# Patient Record
Sex: MALE | Race: WHITE | NOT HISPANIC OR LATINO | Employment: OTHER | URBAN - NONMETROPOLITAN AREA
[De-identification: names, ages, dates, MRNs, and addresses within clinical notes are randomized per-mention and may not be internally consistent; named-entity substitution may affect disease eponyms.]

---

## 2023-08-08 PROCEDURE — 99283 EMERGENCY DEPT VISIT LOW MDM: CPT

## 2023-08-09 ENCOUNTER — HOSPITAL ENCOUNTER (EMERGENCY)
Facility: HOSPITAL | Age: 68
Discharge: HOME | End: 2023-08-09
Payer: COMMERCIAL

## 2023-08-09 VITALS
TEMPERATURE: 98.9 F | RESPIRATION RATE: 18 BRPM | HEART RATE: 72 BPM | BODY MASS INDEX: 23.54 KG/M2 | SYSTOLIC BLOOD PRESSURE: 142 MMHG | HEIGHT: 67 IN | OXYGEN SATURATION: 98 % | WEIGHT: 150 LBS | DIASTOLIC BLOOD PRESSURE: 91 MMHG

## 2023-08-09 DIAGNOSIS — L30.9 DERMATITIS: Primary | ICD-10-CM

## 2023-08-09 LAB
ALBUMIN SERPL-MCNC: 4.4 G/DL (ref 3.5–5.2)
ALBUMIN/GLOB SERPL: 1.8 G/DL
ALP SERPL-CCNC: 121 U/L (ref 39–117)
ALT SERPL W P-5'-P-CCNC: 18 U/L (ref 1–41)
ANION GAP SERPL CALCULATED.3IONS-SCNC: 13 MMOL/L (ref 5–15)
AST SERPL-CCNC: 26 U/L (ref 1–40)
BASOPHILS # BLD AUTO: 0.02 10*3/MM3 (ref 0–0.2)
BASOPHILS NFR BLD AUTO: 0.3 % (ref 0–1.5)
BILIRUB SERPL-MCNC: 0.2 MG/DL (ref 0–1.2)
BUN SERPL-MCNC: 35 MG/DL (ref 8–23)
BUN/CREAT SERPL: 39.8 (ref 7–25)
CALCIUM SPEC-SCNC: 9.1 MG/DL (ref 8.6–10.5)
CHLORIDE SERPL-SCNC: 107 MMOL/L (ref 98–107)
CO2 SERPL-SCNC: 22 MMOL/L (ref 22–29)
CREAT SERPL-MCNC: 0.88 MG/DL (ref 0.76–1.27)
CRP SERPL-MCNC: 0.98 MG/DL (ref 0–0.5)
DEPRECATED RDW RBC AUTO: 43.6 FL (ref 37–54)
EGFRCR SERPLBLD CKD-EPI 2021: 93.7 ML/MIN/1.73
EOSINOPHIL # BLD AUTO: 0.34 10*3/MM3 (ref 0–0.4)
EOSINOPHIL NFR BLD AUTO: 5.4 % (ref 0.3–6.2)
ERYTHROCYTE [DISTWIDTH] IN BLOOD BY AUTOMATED COUNT: 13.4 % (ref 12.3–15.4)
ERYTHROCYTE [SEDIMENTATION RATE] IN BLOOD: 7 MM/HR (ref 0–20)
GLOBULIN UR ELPH-MCNC: 2.5 GM/DL
GLUCOSE SERPL-MCNC: 105 MG/DL (ref 65–99)
HCT VFR BLD AUTO: 40.3 % (ref 37.5–51)
HGB BLD-MCNC: 13.1 G/DL (ref 13–17.7)
IMM GRANULOCYTES # BLD AUTO: 0.02 10*3/MM3 (ref 0–0.05)
IMM GRANULOCYTES NFR BLD AUTO: 0.3 % (ref 0–0.5)
LYMPHOCYTES # BLD AUTO: 1.02 10*3/MM3 (ref 0.7–3.1)
LYMPHOCYTES NFR BLD AUTO: 16.3 % (ref 19.6–45.3)
MCH RBC QN AUTO: 28.7 PG (ref 26.6–33)
MCHC RBC AUTO-ENTMCNC: 32.5 G/DL (ref 31.5–35.7)
MCV RBC AUTO: 88.2 FL (ref 79–97)
MONOCYTES # BLD AUTO: 0.48 10*3/MM3 (ref 0.1–0.9)
MONOCYTES NFR BLD AUTO: 7.7 % (ref 5–12)
NEUTROPHILS NFR BLD AUTO: 4.36 10*3/MM3 (ref 1.7–7)
NEUTROPHILS NFR BLD AUTO: 70 % (ref 42.7–76)
NRBC BLD AUTO-RTO: 0 /100 WBC (ref 0–0.2)
PLATELET # BLD AUTO: 215 10*3/MM3 (ref 140–450)
PMV BLD AUTO: 9.3 FL (ref 6–12)
POTASSIUM SERPL-SCNC: 4 MMOL/L (ref 3.5–5.2)
PROT SERPL-MCNC: 6.9 G/DL (ref 6–8.5)
RBC # BLD AUTO: 4.57 10*6/MM3 (ref 4.14–5.8)
SODIUM SERPL-SCNC: 142 MMOL/L (ref 136–145)
WBC NRBC COR # BLD: 6.24 10*3/MM3 (ref 3.4–10.8)

## 2023-08-09 PROCEDURE — 96375 TX/PRO/DX INJ NEW DRUG ADDON: CPT

## 2023-08-09 PROCEDURE — 85025 COMPLETE CBC W/AUTO DIFF WBC: CPT

## 2023-08-09 PROCEDURE — 86140 C-REACTIVE PROTEIN: CPT

## 2023-08-09 PROCEDURE — 96374 THER/PROPH/DIAG INJ IV PUSH: CPT

## 2023-08-09 PROCEDURE — 80053 COMPREHEN METABOLIC PANEL: CPT

## 2023-08-09 PROCEDURE — 25010000002 DIPHENHYDRAMINE PER 50 MG

## 2023-08-09 PROCEDURE — 25010000002 DEXAMETHASONE PER 1 MG

## 2023-08-09 PROCEDURE — 25010000002 DEXAMETHASONE PER 1 MG: Performed by: EMERGENCY MEDICINE

## 2023-08-09 PROCEDURE — 85652 RBC SED RATE AUTOMATED: CPT

## 2023-08-09 RX ORDER — DEXAMETHASONE SODIUM PHOSPHATE 10 MG/ML
10 INJECTION INTRAMUSCULAR; INTRAVENOUS ONCE
Status: DISCONTINUED | OUTPATIENT
Start: 2023-08-09 | End: 2023-08-09

## 2023-08-09 RX ORDER — PREDNISONE 20 MG/1
60 TABLET ORAL DAILY
Qty: 21 TABLET | Refills: 0 | Status: SHIPPED | OUTPATIENT
Start: 2023-08-09 | End: 2023-08-16

## 2023-08-09 RX ORDER — DEXAMETHASONE SODIUM PHOSPHATE 10 MG/ML
10 INJECTION INTRAMUSCULAR; INTRAVENOUS ONCE
Status: COMPLETED | OUTPATIENT
Start: 2023-08-09 | End: 2023-08-09

## 2023-08-09 RX ORDER — SODIUM CHLORIDE 0.9 % (FLUSH) 0.9 %
10 SYRINGE (ML) INJECTION AS NEEDED
Status: DISCONTINUED | OUTPATIENT
Start: 2023-08-09 | End: 2023-08-09 | Stop reason: HOSPADM

## 2023-08-09 RX ORDER — DIPHENHYDRAMINE HYDROCHLORIDE 50 MG/ML
25 INJECTION INTRAMUSCULAR; INTRAVENOUS ONCE
Status: COMPLETED | OUTPATIENT
Start: 2023-08-09 | End: 2023-08-09

## 2023-08-09 RX ORDER — FAMOTIDINE 10 MG/ML
20 INJECTION, SOLUTION INTRAVENOUS ONCE
Status: COMPLETED | OUTPATIENT
Start: 2023-08-09 | End: 2023-08-09

## 2023-08-09 RX ORDER — DIPHENHYDRAMINE HCL 25 MG
25 TABLET ORAL EVERY 6 HOURS PRN
Qty: 40 TABLET | Refills: 0 | Status: SHIPPED | OUTPATIENT
Start: 2023-08-09

## 2023-08-09 RX ADMIN — FAMOTIDINE 20 MG: 10 INJECTION INTRAVENOUS at 01:05

## 2023-08-09 RX ADMIN — DIPHENHYDRAMINE HYDROCHLORIDE 25 MG: 50 INJECTION, SOLUTION INTRAMUSCULAR; INTRAVENOUS at 01:05

## 2023-08-09 RX ADMIN — DEXAMETHASONE SODIUM PHOSPHATE 10 MG: 10 INJECTION INTRAMUSCULAR; INTRAVENOUS at 01:18

## 2023-08-09 NOTE — DISCHARGE INSTRUCTIONS
Today you were seen in the ED for your symptoms. I am sending in steroids and benadryl which you should use at home. Please follow up with your PCP in the next 24 hours and return to the ED with any new, worsening, or persistent symptoms especially difficulty breathing or shortness of breath.

## 2023-08-09 NOTE — ED PROVIDER NOTES
Subjective   History of Present Illness  Patient is a 67-year-old male who presents to the ED today complaining of facial swelling and rash to bilateral upper extremities that he reports began last night.  He states he noticed his symptoms began when he used a new blanket that was very furry and itchy in nature.  Patient is generally a poor historian, does have some sort of obvious developmental delay, although there is no PMH reported or on file.  He denies taking any chronic medications, specifically no hypertension medications.  On exam there are no obvious signs of angioedema, uvula is midline, voice is clear and normal, no trismus, no swelling noted to the neck.  He does have obvious swelling noted to the face, worse around the periorbital region bilaterally to the point that he is hardly able to open his eyes.  There is an obvious raised, erythemic, vesicular itching rash noted to bilateral upper extremities.  Patient is actively scratching on my exam. Per the nursing note the patient admitted that he also has been working outside over the last few days, gardening. Unsure of any poison ivy exposure. Differential diagnoses: Allergic reaction, contact dermatitis, poison ivy dermatitis, cellulitis, and other.    History provided by:  Patient   used: No      Review of Systems   Constitutional: Negative.    HENT:  Positive for facial swelling. Negative for drooling, sore throat, trouble swallowing and voice change.    Eyes: Negative.    Respiratory:  Negative for cough, shortness of breath, wheezing and stridor.    Cardiovascular: Negative.    Gastrointestinal: Negative.    Genitourinary: Negative.    Musculoskeletal: Negative.    Skin:  Positive for rash.   Neurological: Negative.    Psychiatric/Behavioral: Negative.       History reviewed. No pertinent past medical history.    No Known Allergies    History reviewed. No pertinent surgical history.    History reviewed. No pertinent family  history.    Social History     Socioeconomic History    Marital status:    Tobacco Use    Smoking status: Never    Smokeless tobacco: Never   Vaping Use    Vaping Use: Never used   Substance and Sexual Activity    Alcohol use: Never    Drug use: Never    Sexual activity: Never       Objective   Physical Exam  Vitals and nursing note reviewed.   Constitutional:       General: He is not in acute distress.     Appearance: Normal appearance. He is not ill-appearing, toxic-appearing or diaphoretic.   HENT:      Head:      Comments: Generalized swelling noted to the face, worse around the periorbital region; no obvious discoloration noted     Right Ear: External ear normal.      Left Ear: External ear normal.      Nose: Nose normal.      Mouth/Throat:      Mouth: No angioedema.      Tongue: No lesions. Tongue does not deviate from midline.      Palate: No lesions.      Pharynx: Oropharynx is clear. Uvula midline. No pharyngeal swelling, oropharyngeal exudate, posterior oropharyngeal erythema or uvula swelling.   Eyes:      Extraocular Movements: Extraocular movements intact.      Conjunctiva/sclera: Conjunctivae normal.      Pupils: Pupils are equal, round, and reactive to light.   Cardiovascular:      Rate and Rhythm: Normal rate and regular rhythm.      Pulses: Normal pulses.      Heart sounds: Normal heart sounds.   Pulmonary:      Effort: Pulmonary effort is normal.      Breath sounds: Normal breath sounds.   Musculoskeletal:      Cervical back: Normal range of motion and neck supple. No rigidity or tenderness.   Lymphadenopathy:      Cervical: No cervical adenopathy.   Skin:     General: Skin is warm and dry.      Capillary Refill: Capillary refill takes less than 2 seconds.      Findings: Rash present. Rash is vesicular.      Comments: Erythemic vesicular itching raised rash noted to bilateral upper extremities   Neurological:      Mental Status: He is alert and oriented to person, place, and time. Mental  status is at baseline.      GCS: GCS eye subscore is 4. GCS verbal subscore is 5. GCS motor subscore is 6.   Psychiatric:         Mood and Affect: Mood normal.         Behavior: Behavior normal.         Thought Content: Thought content normal.         Judgment: Judgment normal.     Labs Reviewed   COMPREHENSIVE METABOLIC PANEL - Abnormal; Notable for the following components:       Result Value    Glucose 105 (*)     BUN 35 (*)     Alkaline Phosphatase 121 (*)     BUN/Creatinine Ratio 39.8 (*)     All other components within normal limits    Narrative:     GFR Normal >60  Chronic Kidney Disease <60  Kidney Failure <15     C-REACTIVE PROTEIN - Abnormal; Notable for the following components:    C-Reactive Protein 0.98 (*)     All other components within normal limits   CBC WITH AUTO DIFFERENTIAL - Abnormal; Notable for the following components:    Lymphocyte % 16.3 (*)     All other components within normal limits   SEDIMENTATION RATE - Normal   CBC AND DIFFERENTIAL    Narrative:     The following orders were created for panel order CBC & Differential.  Procedure                               Abnormality         Status                     ---------                               -----------         ------                     CBC Auto Differential[[id number]]        Abnormal            Final result                 Please view results for these tests on the individual orders.     No orders to display     Medications   diphenhydrAMINE (BENADRYL) injection 25 mg (25 mg Intravenous Given 8/9/23 0105)   famotidine (PEPCID) injection 20 mg (20 mg Intravenous Given 8/9/23 0105)   dexAMETHasone (DECADRON) injection 10 mg (10 mg Intravenous Given 8/9/23 0118)     Procedures           ED Course  ED Course as of 08/10/23 1858   Wed Aug 09, 2023   0219 I have discussed the patient's case with Dr. Pool who recommends outpatient management with prednisone 60mg daily and benadryl.  [HE]      ED Course User Index  [HE] English,  JOHN Flor                                           Medical Decision Making  Patient is a 67-year-old male who presents to the ED today complaining of facial swelling and rash to bilateral upper extremities that he reports began last night.  He states he noticed his symptoms began when he used a new blanket that was very furry and itchy in nature.  Patient is generally a poor historian, does have some sort of obvious developmental delay, although there is no PMH reported or on file.  He denies taking any chronic medications, specifically no hypertension medications.  On exam there are no obvious signs of angioedema, uvula is midline, voice is clear and normal, no trismus, no swelling noted to the neck.  He does have obvious swelling noted to the face, worse around the periorbital region bilaterally to the point that he is hardly able to open his eyes.  There is an obvious raised, erythemic, vesicular itching rash noted to bilateral upper extremities.  Patient is actively scratching on my exam. Per the nursing note the patient admitted that he also has been working outside over the last few days, gardening. Unsure of any poison ivy exposure. Differential diagnoses: Allergic reaction, contact dermatitis, poison ivy dermatitis, cellulitis, and other.     Patient is in no obvious respiratory distress at this time, lung sounds are clear throughout bilaterally, patient denies any shortness of breath or difficulty breathing.    Patient was given Decadron, Benadryl, and Pepcid in the ED for symptom management.  ESR normal, CRP very slightly elevated at 0.98.  CBC reveals normal white count, H&H, and platelet level.  CMP reveals normal renal and hepatic function, normal electrolytes.    Results discussed at bedside.  Case discussed with Dr. Pool.  He offers no further workup recommendations and recommends outpatient treatment and close follow-up with PCP.  This is likely a contact dermatitis and/or a poison ivy  dermatitis.  I am sending in prednisone, he will continue Benadryl and Pepcid at home.  Return precautions given.  Vital signs reassuring, patient agreeable and appreciative, discharged in stable condition.      Problems Addressed:  Allergic dermatitis: complicated acute illness or injury    Amount and/or Complexity of Data Reviewed  Labs: ordered.    Risk  OTC drugs.  Prescription drug management.        Final diagnoses:   Dermatitis       ED Disposition  ED Disposition       ED Disposition   Discharge    Condition   Stable    Comment   --               PATIENT CONNECTION - [facility] [id number]  [phone]             Medication List        New Prescriptions      diphenhydrAMINE 25 MG tablet  Commonly known as: Benadryl Allergy  Take 1 tablet by mouth Every 6 (Six) Hours As Needed for Itching.     predniSONE 20 MG tablet  Commonly known as: DELTASONE  Take 3 tablets by mouth Daily for 7 days.               Where to Get Your Medications        These medications were sent to [facility] [address] [place] [address] - [phone]  - [phone] [place] [id number]      Phone: [phone]   diphenhydrAMINE 25 MG tablet  predniSONE 20 MG tablet            Moon Jerome, APRN  08/10/23 9441

## 2024-06-03 ENCOUNTER — HOSPITAL ENCOUNTER (EMERGENCY)
Facility: HOSPITAL | Age: 69
Discharge: HOME | End: 2024-06-03
Attending: EMERGENCY MEDICINE
Payer: MEDICARE

## 2024-06-03 VITALS
HEART RATE: 67 BPM | SYSTOLIC BLOOD PRESSURE: 129 MMHG | DIASTOLIC BLOOD PRESSURE: 83 MMHG | RESPIRATION RATE: 14 BRPM | OXYGEN SATURATION: 94 %

## 2024-06-03 DIAGNOSIS — Z76.0 ENCOUNTER FOR MEDICATION REFILL: ICD-10-CM

## 2024-06-03 DIAGNOSIS — L30.9 DERMATITIS: ICD-10-CM

## 2024-06-03 DIAGNOSIS — H10.33 ACUTE CONJUNCTIVITIS OF BOTH EYES, UNSPECIFIED ACUTE CONJUNCTIVITIS TYPE: Primary | ICD-10-CM

## 2024-06-03 LAB
ALBUMIN SERPL-MCNC: 3.3 G/DL (ref 3.4–5)
ALBUMIN/GLOB SERPL: 0.8 {RATIO} (ref 1–2)
ALP LIVER SERPL-CCNC: 135 U/L
ALT SERPL-CCNC: 21 U/L
ANION GAP SERPL CALC-SCNC: 4 MMOL/L (ref 0–18)
AST SERPL-CCNC: 16 U/L (ref 15–37)
BILIRUB SERPL-MCNC: 0.2 MG/DL (ref 0.1–2)
BUN BLD-MCNC: 25 MG/DL (ref 9–23)
CALCIUM BLD-MCNC: 8.6 MG/DL (ref 8.5–10.1)
CHLORIDE SERPL-SCNC: 108 MMOL/L (ref 98–112)
CO2 SERPL-SCNC: 28 MMOL/L (ref 21–32)
CREAT BLD-MCNC: 1.12 MG/DL
EGFRCR SERPLBLD CKD-EPI 2021: 71 ML/MIN/1.73M2 (ref 60–?)
GLOBULIN PLAS-MCNC: 4 G/DL (ref 2.8–4.4)
GLUCOSE BLD-MCNC: 142 MG/DL (ref 70–99)
OSMOLALITY SERPL CALC.SUM OF ELEC: 297 MOSM/KG (ref 275–295)
PHENYTOIN SERPL-MCNC: 2.9 UG/ML (ref 10–20)
POTASSIUM SERPL-SCNC: 4 MMOL/L (ref 3.5–5.1)
PROT SERPL-MCNC: 7.3 G/DL (ref 6.4–8.2)
SODIUM SERPL-SCNC: 140 MMOL/L (ref 136–145)

## 2024-06-03 PROCEDURE — 80053 COMPREHEN METABOLIC PANEL: CPT | Performed by: EMERGENCY MEDICINE

## 2024-06-03 PROCEDURE — 99284 EMERGENCY DEPT VISIT MOD MDM: CPT

## 2024-06-03 PROCEDURE — 36415 COLL VENOUS BLD VENIPUNCTURE: CPT

## 2024-06-03 PROCEDURE — 80185 ASSAY OF PHENYTOIN TOTAL: CPT | Performed by: EMERGENCY MEDICINE

## 2024-06-03 PROCEDURE — 99283 EMERGENCY DEPT VISIT LOW MDM: CPT

## 2024-06-03 RX ORDER — PHENYTOIN SODIUM 100 MG/1
CAPSULE, EXTENDED RELEASE ORAL
COMMUNITY

## 2024-06-03 RX ORDER — PHENYTOIN SODIUM 100 MG/1
100 CAPSULE, EXTENDED RELEASE ORAL 3 TIMES DAILY
Qty: 90 CAPSULE | Refills: 0 | Status: SHIPPED | OUTPATIENT
Start: 2024-06-03 | End: 2024-07-03

## 2024-06-03 RX ORDER — POLYMYXIN B SULFATE AND TRIMETHOPRIM 1; 10000 MG/ML; [USP'U]/ML
1 SOLUTION OPHTHALMIC
Qty: 10 ML | Refills: 0 | Status: SHIPPED | OUTPATIENT
Start: 2024-06-03 | End: 2024-06-08

## 2024-06-03 NOTE — ED PROVIDER NOTES
Patient Seen in: [facility] Emergency Department      History     Chief Complaint   Patient presents with    Swelling Edema     Stated Complaint: pt states he woke up with a swollen face, friend of pt thinks he had a seizure.    Subjective:   HPI    68-year-old with a history of epilepsy on Dilantin presents with his landlord for evaluation of watery eyes, facial swelling and a refill request for his Dilantin.  He is a poor historian and may have some developmental delay.  Patient woke up 3 days ago with swelling around both eyes, with runny nose and sneezing. Eyes were watering this morning, and were red and crusted shut. His friend gave him allergy pills. She notes that he only had one pill of dilantin when went in there today and she thinks he may have run out. No injury, not aware of any fall. Not aware of any recent seizures but lives alone. No HA. No vision changes. No trouble with speech. No fever. No sore throat or cough. No new medications. No vomiting.   ED note from [facility] reviewed from 8/9/2023 patient presented for facial swelling and rash to the bilateral upper extremities.  This notes periorbital swelling as well as a rash to the bilateral upper extremities.  Diagnosed with contact dermatitis.  Objective:   Past Medical History:    Epilepsy (HCC)    Stroke (HCC)              History reviewed. No pertinent surgical history.             Social History     Socioeconomic History    Marital status: Single   Tobacco Use    Smoking status: Unknown     Social Determinants of Health      Received from [facility]    Family and Community Support    Received from [facility]    Housing Stability              Review of Systems    Positive for stated complaint: pt states he woke up with a swollen face, friend of pt thinks he had a seizure.  Other systems are as noted in HPI.  Constitutional and vital signs reviewed.      All other systems reviewed and negative except  as noted above.    Physical Exam     ED Triage Vitals [06/03/24 0645]   /88   Pulse 79   Resp 20   Temp    Temp src    SpO2 92 %   O2 Device None (Room air)       Current Vitals:   Vital Signs  BP: 129/83  Pulse: 67  Resp: 14  MAP (mmHg): 95    Oxygen Therapy  SpO2: 94 %  O2 Device: None (Room air)            Physical Exam    General: Patient is awake, alert in no acute distress.   HEENT:  Pupils are PERRL. Extraocular muscles are intact.  Sclera are not icteric.  Conjunctivae mildly injected bilaterally.  Scant purulent drainage from the left eye.  Mild periorbital edema and erythema with skin thickening, no induration, warmth, tenderness.  Mucous members are moist.   Cardiovascular: Regular rate and rhythm, normal S1-S2.  Respiratory: Lungs are clear to auscultation bilaterally.   Extremities: No edema.  Neuro: Cranial nerves II through XII are intact bilaterally.  Normal strength and sensation bilateral UE and LE.  Patient is alert and answers questions appropriately    ED Course     Labs Reviewed   COMP METABOLIC PANEL (14) - Abnormal; Notable for the following components:       Result Value    Glucose 142 (*)     BUN 25 (*)     Calculated Osmolality 297 (*)     Alkaline Phosphatase 135 (*)     Albumin 3.3 (*)     A/G Ratio 0.8 (*)     All other components within normal limits   PHENYTOIN (DILANTIN) TOTAL - Abnormal; Notable for the following components:    Phenytoin (Dilantin) 2.9 (*)     All other components within normal limits   RAINBOW DRAW LAVENDER   RAINBOW DRAW LIGHT GREEN   RAINBOW DRAW BLUE   RAINBOW DRAW GOLD                      MDM      History is obtained from: His landlord    Previous records reviewed as noted in HPI    Differential includes, but is not limited to, orbital cellulitis, bacterial conjunctivitis, allergic conjunctivitis, subtherapeutic Dilantin level    Review of any laboratory testing: Phenytoin level is subtherapeutic.  CMP with minimal hyperglycemia.    Shared decision  making with the patient.  Will provide refill of patient's Dilantin although advised that he needs to follow-up with his PCP/neurologist for chronic management.  He does not know his Dilantin dose but said he took 3 pills.    Consultants utilized: Spoke with patient stated pharmacy, he has not refilled the medication in approximately 9 months but was taking 100 mg Dilantin 3 times daily.    OTC meds/Rx meds: Dilantin refilled per patient's pharmacy reported dose.  Recommend hydrocortisone cream on the facial rash but avoiding the eye                                                        Medical Decision Making      Disposition and Plan     Clinical Impression:  1. Acute conjunctivitis of both eyes, unspecified acute conjunctivitis type    2. Encounter for medication refill    3. Dermatitis         Disposition:  Discharge  6/3/2024  8:09 am    Follow-up:  Ghassan Siddiqui  [address]  [address] [address] [phone]    Schedule an appointment as soon as possible for a visit in 3 day(s)            Medications Prescribed:  Current Discharge Medication List        START taking these medications    Details   polymyxin B-trimethoprim 82454-7.1 UNIT/ML-% Ophthalmic Solution Apply 1 drop to eye Q3H While Awake for 5 days.  Qty: 10 mL, Refills: 0      !! phenytoin  MG Oral Cap Take 1 capsule (100 mg total) by mouth 3 (three) times daily.  Qty: 90 capsule, Refills: 0       !! - Potential duplicate medications found. Please discuss with provider.

## 2024-06-03 NOTE — ED QUICK NOTES
Pt awaiting ride in waiting room. Arvin called that pt would be in waiting room awaiting ride. Pt and arvin agreeable to plan .

## 2024-06-03 NOTE — ED INITIAL ASSESSMENT (HPI)
Patient to ED with friend with c/o swelling to face x 2-3days. Patient friend thinks he is out of dilantin and may have had a seizure, unsure

## 2024-10-17 ENCOUNTER — APPOINTMENT (OUTPATIENT)
Dept: CT IMAGING | Facility: HOSPITAL | Age: 69
End: 2024-10-17
Attending: EMERGENCY MEDICINE
Payer: MEDICARE

## 2024-10-17 ENCOUNTER — HOSPITAL ENCOUNTER (INPATIENT)
Facility: HOSPITAL | Age: 69
LOS: 5 days | Discharge: HOME | End: 2024-10-22
Attending: EMERGENCY MEDICINE | Admitting: INTERNAL MEDICINE
Payer: MEDICARE

## 2024-10-17 DIAGNOSIS — K92.1 MELENA: ICD-10-CM

## 2024-10-17 DIAGNOSIS — K20.90 ESOPHAGITIS: ICD-10-CM

## 2024-10-17 DIAGNOSIS — D64.9 ANEMIA, UNSPECIFIED TYPE: ICD-10-CM

## 2024-10-17 DIAGNOSIS — K92.2 UPPER GI BLEED: Primary | ICD-10-CM

## 2024-10-17 LAB
ALBUMIN SERPL-MCNC: 3.4 G/DL (ref 3.2–4.8)
ALBUMIN/GLOB SERPL: 1.5 {RATIO} (ref 1–2)
ALP LIVER SERPL-CCNC: 80 U/L
ALT SERPL-CCNC: 15 U/L
ANION GAP SERPL CALC-SCNC: 6 MMOL/L (ref 0–18)
AST SERPL-CCNC: 19 U/L (ref ?–34)
BASOPHILS # BLD AUTO: 0.04 X10(3) UL (ref 0–0.2)
BASOPHILS NFR BLD AUTO: 0.5 %
BILIRUB SERPL-MCNC: 0.3 MG/DL (ref 0.2–1.1)
BILIRUB UR QL STRIP.AUTO: NEGATIVE
BUN BLD-MCNC: 37 MG/DL (ref 9–23)
CALCIUM BLD-MCNC: 8 MG/DL (ref 8.7–10.4)
CHLORIDE SERPL-SCNC: 104 MMOL/L (ref 98–112)
CLARITY UR REFRACT.AUTO: CLEAR
CO2 SERPL-SCNC: 26 MMOL/L (ref 21–32)
CREAT BLD-MCNC: 0.93 MG/DL
DEPRECATED HBV CORE AB SER IA-ACNC: 6 NG/ML
EGFRCR SERPLBLD CKD-EPI 2021: 88 ML/MIN/1.73M2 (ref 60–?)
EOSINOPHIL # BLD AUTO: 0.04 X10(3) UL (ref 0–0.7)
EOSINOPHIL NFR BLD AUTO: 0.5 %
ERYTHROCYTE [DISTWIDTH] IN BLOOD BY AUTOMATED COUNT: 15.1 %
GLOBULIN PLAS-MCNC: 2.2 G/DL (ref 2–3.5)
GLUCOSE BLD-MCNC: 114 MG/DL (ref 70–99)
GLUCOSE UR STRIP.AUTO-MCNC: NORMAL MG/DL
HCT VFR BLD AUTO: 27.3 %
HGB BLD-MCNC: 9 G/DL
IMM GRANULOCYTES # BLD AUTO: 0.04 X10(3) UL (ref 0–1)
IMM GRANULOCYTES NFR BLD: 0.5 %
IRON SATN MFR SERPL: 5 %
IRON SERPL-MCNC: 13 UG/DL
KETONES UR STRIP.AUTO-MCNC: NEGATIVE MG/DL
LEUKOCYTE ESTERASE UR QL STRIP.AUTO: NEGATIVE
LIPASE SERPL-CCNC: 25 U/L (ref 12–53)
LYMPHOCYTES # BLD AUTO: 1.29 X10(3) UL (ref 1–4)
LYMPHOCYTES NFR BLD AUTO: 16.1 %
MCH RBC QN AUTO: 24.1 PG (ref 26–34)
MCHC RBC AUTO-ENTMCNC: 33 G/DL (ref 31–37)
MCV RBC AUTO: 73.2 FL
MONOCYTES # BLD AUTO: 0.92 X10(3) UL (ref 0.1–1)
MONOCYTES NFR BLD AUTO: 11.5 %
NEUTROPHILS # BLD AUTO: 5.7 X10 (3) UL (ref 1.5–7.7)
NEUTROPHILS # BLD AUTO: 5.7 X10(3) UL (ref 1.5–7.7)
NEUTROPHILS NFR BLD AUTO: 70.9 %
NITRITE UR QL STRIP.AUTO: NEGATIVE
OSMOLALITY SERPL CALC.SUM OF ELEC: 292 MOSM/KG (ref 275–295)
PH UR STRIP.AUTO: 5.5 [PH] (ref 5–8)
PHENYTOIN SERPL-MCNC: 9.9 UG/ML (ref 10–20)
PLATELET # BLD AUTO: 265 10(3)UL (ref 150–450)
POTASSIUM SERPL-SCNC: 3.7 MMOL/L (ref 3.5–5.1)
PROT SERPL-MCNC: 5.6 G/DL (ref 5.7–8.2)
PROT UR STRIP.AUTO-MCNC: NEGATIVE MG/DL
RBC # BLD AUTO: 3.73 X10(6)UL
RBC UR QL AUTO: NEGATIVE
SODIUM SERPL-SCNC: 136 MMOL/L (ref 136–145)
SP GR UR STRIP.AUTO: 1.02 (ref 1–1.03)
TOTAL IRON BINDING CAPACITY: 266 UG/DL (ref 250–425)
TRANSFERRIN SERPL-MCNC: 205 MG/DL (ref 215–365)
UROBILINOGEN UR STRIP.AUTO-MCNC: NORMAL MG/DL
WBC # BLD AUTO: 8 X10(3) UL (ref 4–11)

## 2024-10-17 PROCEDURE — 74177 CT ABD & PELVIS W/CONTRAST: CPT | Performed by: EMERGENCY MEDICINE

## 2024-10-17 PROCEDURE — 99223 1ST HOSP IP/OBS HIGH 75: CPT | Performed by: INTERNAL MEDICINE

## 2024-10-17 RX ORDER — METOCLOPRAMIDE HYDROCHLORIDE 5 MG/ML
10 INJECTION INTRAMUSCULAR; INTRAVENOUS EVERY 8 HOURS PRN
Status: DISCONTINUED | OUTPATIENT
Start: 2024-10-17 | End: 2024-10-22

## 2024-10-17 RX ORDER — ONDANSETRON 2 MG/ML
4 INJECTION INTRAMUSCULAR; INTRAVENOUS ONCE
Status: COMPLETED | OUTPATIENT
Start: 2024-10-17 | End: 2024-10-17

## 2024-10-17 RX ORDER — PHENYTOIN SODIUM 100 MG/1
200 CAPSULE, EXTENDED RELEASE ORAL NIGHTLY
Status: DISCONTINUED | OUTPATIENT
Start: 2024-10-17 | End: 2024-10-22

## 2024-10-17 RX ORDER — POLYETHYLENE GLYCOL 3350 17 G/17G
17 POWDER, FOR SOLUTION ORAL DAILY PRN
Status: DISCONTINUED | OUTPATIENT
Start: 2024-10-17 | End: 2024-10-22

## 2024-10-17 RX ORDER — SODIUM CHLORIDE 9 MG/ML
INJECTION, SOLUTION INTRAVENOUS CONTINUOUS
Status: ACTIVE | OUTPATIENT
Start: 2024-10-17 | End: 2024-10-17

## 2024-10-17 RX ORDER — ECHINACEA PURPUREA EXTRACT 125 MG
1 TABLET ORAL
Status: DISCONTINUED | OUTPATIENT
Start: 2024-10-17 | End: 2024-10-22

## 2024-10-17 RX ORDER — PHENYTOIN SODIUM 100 MG/1
300 CAPSULE, EXTENDED RELEASE ORAL DAILY
Status: DISCONTINUED | OUTPATIENT
Start: 2024-10-18 | End: 2024-10-22

## 2024-10-17 RX ORDER — MELATONIN
3 NIGHTLY PRN
Status: DISCONTINUED | OUTPATIENT
Start: 2024-10-17 | End: 2024-10-22

## 2024-10-17 RX ORDER — ACETAMINOPHEN 500 MG
1000 TABLET ORAL EVERY 8 HOURS PRN
Status: DISCONTINUED | OUTPATIENT
Start: 2024-10-17 | End: 2024-10-22

## 2024-10-17 RX ORDER — BISACODYL 10 MG
10 SUPPOSITORY, RECTAL RECTAL
Status: DISCONTINUED | OUTPATIENT
Start: 2024-10-17 | End: 2024-10-22

## 2024-10-17 RX ORDER — ONDANSETRON 2 MG/ML
4 INJECTION INTRAMUSCULAR; INTRAVENOUS EVERY 4 HOURS PRN
Status: DISCONTINUED | OUTPATIENT
Start: 2024-10-17 | End: 2024-10-17

## 2024-10-17 RX ORDER — SODIUM PHOSPHATE, DIBASIC AND SODIUM PHOSPHATE, MONOBASIC 7; 19 G/230ML; G/230ML
1 ENEMA RECTAL ONCE AS NEEDED
Status: DISCONTINUED | OUTPATIENT
Start: 2024-10-17 | End: 2024-10-22

## 2024-10-17 RX ORDER — SODIUM CHLORIDE, SODIUM LACTATE, POTASSIUM CHLORIDE, CALCIUM CHLORIDE 600; 310; 30; 20 MG/100ML; MG/100ML; MG/100ML; MG/100ML
INJECTION, SOLUTION INTRAVENOUS CONTINUOUS
Status: DISCONTINUED | OUTPATIENT
Start: 2024-10-17 | End: 2024-10-19

## 2024-10-17 RX ORDER — SENNOSIDES 8.6 MG
17.2 TABLET ORAL NIGHTLY PRN
Status: DISCONTINUED | OUTPATIENT
Start: 2024-10-17 | End: 2024-10-22

## 2024-10-17 RX ORDER — ONDANSETRON 2 MG/ML
4 INJECTION INTRAMUSCULAR; INTRAVENOUS EVERY 6 HOURS PRN
Status: DISCONTINUED | OUTPATIENT
Start: 2024-10-17 | End: 2024-10-22

## 2024-10-17 RX ORDER — BENZONATATE 100 MG/1
200 CAPSULE ORAL 3 TIMES DAILY PRN
Status: DISCONTINUED | OUTPATIENT
Start: 2024-10-17 | End: 2024-10-22

## 2024-10-17 NOTE — ED PROVIDER NOTES
Patient Seen in: [facility] Emergency Department      History     Chief Complaint   Patient presents with    Abdomen/Flank Pain     Stated Complaint: abd pain and hicups    Subjective:   HPI      69-year-old male with a history of developmental delay and seizure disorder presenting for evaluation of abdominal pain.  He is a good historian.  He reports intermittent epigastric pain for the last 2 days with episodes of both vomiting and diarrhea.  He reports the vomiting and diarrhea are about an equal amounts.  Has been able to keep some water down and states his stomach actually feels better when he drinks water.  Has had the hiccups intermittently as well.    Objective:     Past Medical History:    Epilepsy (HCC)    Stroke (HCC)              History reviewed. No pertinent surgical history.             Social History     Socioeconomic History    Marital status: Single   Tobacco Use    Smoking status: Never    Smokeless tobacco: Never   Vaping Use    Vaping status: Never Used   Substance and Sexual Activity    Alcohol use: Not Currently    Drug use: Not Currently     Social Drivers of Health      Received from [facility]    Family and Community Support    Received from [facility]    Housing Stability                  Physical Exam     ED Triage Vitals [10/17/24 1658]   /76   Pulse 90   Resp 18   Temp 98.3 °F (36.8 °C)   Temp src Oral   SpO2 100 %   O2 Device None (Room air)       Current Vitals:   Vital Signs  BP: 135/76  Pulse: 88  Resp: 16  Temp: 98.3 °F (36.8 °C)  Temp src: Oral  MAP (mmHg): 92    Oxygen Therapy  SpO2: 100 %  O2 Device: None (Room air)        Physical Exam  Vitals and nursing note reviewed.   Constitutional:       Appearance: He is well-developed.   HENT:      Head: Normocephalic and atraumatic.   Eyes:      Conjunctiva/sclera: Conjunctivae normal.   Cardiovascular:      Rate and Rhythm: Normal rate and regular rhythm.      Heart sounds: Normal heart  sounds.   Pulmonary:      Effort: Pulmonary effort is normal.      Breath sounds: Normal breath sounds.   Abdominal:      General: Bowel sounds are normal.      Palpations: Abdomen is soft.      Comments: Minimal epigastric tenderness.  Nondistended.  No rebound or guarding.   Genitourinary:     Comments: Black guaiac positive stool.  No red blood.  Musculoskeletal:         General: Normal range of motion.      Cervical back: Normal range of motion and neck supple.   Skin:     General: Skin is warm and dry.   Neurological:      Mental Status: He is alert and oriented to person, place, and time.             ED Course     Labs Reviewed   COMP METABOLIC PANEL (14) - Abnormal; Notable for the following components:       Result Value    Glucose 114 (*)     BUN 37 (*)     Calcium, Total 8.0 (*)     Total Protein 5.6 (*)     All other components within normal limits   CBC WITH DIFFERENTIAL WITH PLATELET - Abnormal; Notable for the following components:    RBC 3.73 (*)     HGB 9.0 (*)     HCT 27.3 (*)     MCV 73.2 (*)     MCH 24.1 (*)     All other components within normal limits   PHENYTOIN (DILANTIN) TOTAL - Abnormal; Notable for the following components:    Phenytoin (Dilantin) 9.9 (*)     All other components within normal limits   LIPASE - Normal   URINALYSIS WITH CULTURE REFLEX            CT ABDOMEN+PELVIS(CONTRAST ONLY)(CPT=74177)    Result Date: 10/17/2024  PROCEDURE:  CT ABDOMEN+PELVIS (CONTRAST ONLY) (CPT=74177)  COMPARISON:  None.  INDICATIONS:  abd pain and hicups  TECHNIQUE:  CT scanning was performed from the dome of the diaphragm to the pubic symphysis with non-ionic intravenous contrast material. Post contrast coronal MPR imaging was performed.  Dose reduction techniques were used. Dose information is transmitted to the ACR (American College of Radiology) NRDR (National Radiology Data Registry) which includes the Dose Index Registry.  PATIENT STATED HISTORY:(As transcribed by Technologist)  Mid abd pain x 2  days with hiccups.    CONTRAST USED:  80cc of Isovue 370  FINDINGS:  Patient motion noted. LUNG BASE:  Scattered atelectasis/scarring.  Noncalcified pulmonary nodule along the lateral left lower lobe measuring up to 6 mm. LIVER:  Subcentimeter hypodensities in the liver measuring up to 6 mm are too small to further characterize and warrant no specific follow-up. BILIARY:  Unremarkable. SPLEEN:  Calcified granulomas. PANCREAS:  Unremarkable. ADRENALS:  Unremarkable. KIDNEYS:  Simple exophytic cyst along the upper pole right kidney measuring up to 5.1 cm. Scattered subcentimeter hypodensities in the kidneys measuring up to 9 mm are too small to further characterize and warrant no specific follow-up.  1 cm simple appearing cyst in the mid left kidney. AORTA/VASCULAR:  Mild mixed plaque in the aorta and iliac arteries. RETROPERITONEUM:  Unremarkable. BOWEL/MESENTERY:  There is moderate thickening of the distal esophagus that is concerning for nonspecific esophagitis.  Medium-sized hiatal hernia.  Reflux esophagitis is not excluded.  Wall thickening most pronounced in the upper stomach may be related to incomplete distension, with nonspecific gastritis or underlying neoplasm not entirely excluded.  Clinical correlation recommended along with endoscopic evaluation as clinically directed.  There is small bowel wall thickening, scattered fluid in the small bowel, colonic wall thickening along the cecum and proximal ascending colon that is concerning for changes of nonspecific enterocolitis.  Clinical correlation recommended.  No free air or free fluid. ABDOMINAL WALL:  Unremarkable. PELVIC ORGANS:  Urinary bladder wall thickening with fatty induration is concerning for cystitis, with changes of chronic outlet obstruction or other etiologies not excluded.  Clinical correlation recommended.  Mild to moderate prostate enlargement deforming the base the urinary bladder.  Pelvic phleboliths. LYMPH NODES:  No lymphadenopathy in  the abdomen or pelvis. BONES:  Degenerative changes in the spine and hips. OTHER:  None.            CONCLUSION:  Suboptimal exam due to patient motion.  1. There is moderate thickening of the distal esophagus that is concerning for nonspecific esophagitis.  Medium-sized hiatal hernia.  Reflux esophagitis is not excluded.  Wall thickening most pronounced in the upper stomach may be related to incomplete distension, with nonspecific gastritis or underlying neoplasm not entirely excluded.  Clinical correlation recommended along with endoscopic evaluation as clinically directed.   2. There is small bowel wall thickening, scattered fluid in the small bowel, colonic wall thickening along the cecum and proximal ascending colon that is concerning for changes of nonspecific enterocolitis.  Clinical correlation recommended.  3. Urinary bladder wall thickening with fatty induration is concerning for cystitis, with changes of chronic outlet obstruction or other etiologies not excluded.  Clinical correlation recommended.  Mild to moderate prostate enlargement deforming the base  the urinary bladder.  4. Noncalcified pulmonary nodule along the lateral left lower lobe measuring up to 6 mm. Followup as per Fleischner criteria is suggested.  Please see above for further details.  The findings include a single, incidentally detected, solid pulmonary nodule, measuring less than 6mm.  2017 guidelines from the Fleischner Society for the follow-up and management of incidentally detected indeterminate pulmonary nodules in persons at least 35 years of age depend on nodule size (average length and width) and underlying risk factors (including smoking and other risk factors). Please consider the following recommendations after clinical assessment of risk factors.  For <6mm solid nodules: In low risk patients, no follow-up required.  If suspicious morphology or upper lobe location, consider 12 month follow-up.  In high risk patients, optional  CT in 12 months.   LOCATION:  [facility]   Dictated by (CST): Jose Antonio Reynolds MD on 10/17/2024 at 6:59 PM     Finalized by (CST): Jose Antonio Reynolds MD on 10/17/2024 at 7:07 PM             MDM      69-year-old male presenting for evaluation of epigastric abdominal pain along with intermittent vomiting and diarrhea and about equal amounts.  Symptoms have been present for the last 2 days.  He is awake and alert here and well-appearing, no distress.  Minimal epigastric tenderness without rebound or guarding.  Differential includes gastroenteritis, gastritis, pancreatitis.  Labs ordered we will send him for a CT to further evaluate for intra-abdominal pathology.    Admission disposition: 10/17/2024  7:45 PM         Update at 7:40 PM.  Labs demonstrate anemia with a hemoglobin of 9.  Last labs for comparison as August 2023, at that time hemoglobin was 13.1 so this may be acute blood loss.  He reports both his vomit and diarrhea was \"dark\" and look like raisins although he says he was eating a lot of raisins.  However he does have black guaiac positive stool from below.  Concerning for upper GI bleed.  Additionally CT as above demonstrates esophagitis as well as enteritis.  In light of the findings he will need to be admitted.  He does not need emergent transfusion but will have to have his hemoglobin followed.  Protonix ordered.  Will discuss with GI.        Past Medical History-seizure disorder    Differential diagnosis before testing included gastritis, pancreatitis, gastroenteritis    Co-morbidities that add to the complexity of management include: None    Testing ordered during this visit included labs, CT    Radiographic images  I personally reviewed the radiographs and my individual interpretation shows distal esophageal wall thickening  I also reviewed the official reports that showed esophageal wall thickening    Reviewed prior records for comparison of hemoglobin values.    Discussion of management with hospitalist,  GI        Medications Provided: Fluids, Zofran, Protonix            Disposition:    Admission  I have discussed with the patient the results of test, differential diagnosis, and treatment plan. They expressed clear understanding of these instructions and agrees to the plan provided.           Medical Decision Making      Disposition and Plan     Clinical Impression:  1. Upper GI bleed    2. Anemia, unspecified type    3. Esophagitis         Disposition:  Admit  10/17/2024  7:45 pm    Follow-up:  No follow-up provider specified.        Medications Prescribed:  Current Discharge Medication List              Supplementary Documentation:         Hospital Problems       Present on Admission             ICD-10-CM Noted POA    * (Principal) Upper GI bleed K92.2 10/17/2024 Unknown

## 2024-10-17 NOTE — ED QUICK NOTES
Rounding Completed    Plan of Care reviewed. Waiting for CT.  Elimination needs assessed, urine collected.    Bed is locked and in lowest position. Call light within reach.

## 2024-10-17 NOTE — ED INITIAL ASSESSMENT (HPI)
Mid abd pain x 2 days with hiccups. NVD since yesterday. Reports vomit was very dark. Bloodwork done at primary care this morning.

## 2024-10-18 LAB
ANION GAP SERPL CALC-SCNC: 6 MMOL/L (ref 0–18)
BASOPHILS # BLD AUTO: 0.04 X10(3) UL (ref 0–0.2)
BASOPHILS NFR BLD AUTO: 0.8 %
BUN BLD-MCNC: 22 MG/DL (ref 9–23)
CALCIUM BLD-MCNC: 7.8 MG/DL (ref 8.7–10.4)
CHLORIDE SERPL-SCNC: 109 MMOL/L (ref 98–112)
CO2 SERPL-SCNC: 26 MMOL/L (ref 21–32)
CREAT BLD-MCNC: 0.74 MG/DL
EGFRCR SERPLBLD CKD-EPI 2021: 97 ML/MIN/1.73M2 (ref 60–?)
EOSINOPHIL # BLD AUTO: 0.09 X10(3) UL (ref 0–0.7)
EOSINOPHIL NFR BLD AUTO: 1.9 %
ERYTHROCYTE [DISTWIDTH] IN BLOOD BY AUTOMATED COUNT: 15.3 %
GLUCOSE BLD-MCNC: 95 MG/DL (ref 70–99)
HCT VFR BLD AUTO: 24 %
HGB BLD-MCNC: 7.9 G/DL
IMM GRANULOCYTES # BLD AUTO: 0.02 X10(3) UL (ref 0–1)
IMM GRANULOCYTES NFR BLD: 0.4 %
LYMPHOCYTES # BLD AUTO: 0.88 X10(3) UL (ref 1–4)
LYMPHOCYTES NFR BLD AUTO: 18.4 %
MAGNESIUM SERPL-MCNC: 1.9 MG/DL (ref 1.6–2.6)
MCH RBC QN AUTO: 24.9 PG (ref 26–34)
MCHC RBC AUTO-ENTMCNC: 32.9 G/DL (ref 31–37)
MCV RBC AUTO: 75.7 FL
MONOCYTES # BLD AUTO: 0.66 X10(3) UL (ref 0.1–1)
MONOCYTES NFR BLD AUTO: 13.8 %
NEUTROPHILS # BLD AUTO: 3.08 X10 (3) UL (ref 1.5–7.7)
NEUTROPHILS # BLD AUTO: 3.08 X10(3) UL (ref 1.5–7.7)
NEUTROPHILS NFR BLD AUTO: 64.7 %
OSMOLALITY SERPL CALC.SUM OF ELEC: 295 MOSM/KG (ref 275–295)
PLATELET # BLD AUTO: 226 10(3)UL (ref 150–450)
POTASSIUM SERPL-SCNC: 3.4 MMOL/L (ref 3.5–5.1)
RBC # BLD AUTO: 3.17 X10(6)UL
SODIUM SERPL-SCNC: 141 MMOL/L (ref 136–145)
WBC # BLD AUTO: 4.8 X10(3) UL (ref 4–11)

## 2024-10-18 PROCEDURE — 99232 SBSQ HOSP IP/OBS MODERATE 35: CPT | Performed by: HOSPITALIST

## 2024-10-18 RX ORDER — PHENYTOIN SODIUM 100 MG/1
300 CAPSULE, EXTENDED RELEASE ORAL EVERY MORNING
COMMUNITY

## 2024-10-18 NOTE — CM/SW NOTE
10/18/24 1600   CM/SW Referral Data   Referral Source    Reason for Referral Discharge planning   Informant EMR     HOME SITUATION  Type of Home: House           Stairs to Bedroom:  (\"I sleep in the basement\")         Lives With:  (\"I live with Magui\")    Drives: No   Patient Regularly Uses: Reading glasses      Prior Level of [place]: Patient with developmental delay at baseline. Reports that he functions independently at baseline. Unable to provide much detail about living environment, answering \"I live with Magui.\" (sp?)    PT stating pt is near [place], prior to admission pt was independent. Per SW notes, pt lives with boss.     No family at bedside at time of visit. SW/CM to follow for discharge needs.     Gerald Angulo, MSN RN, CM

## 2024-10-18 NOTE — H&P
[facility]     Yusuf Wetzel Patient Status:  Emergency    10/1/1955 MRN [id number]   Location [facility] EMERGENCY DEPARTMENT Attending Jaswant Rose MD   Hosp Day # 0 PCP WEI THORPE     Chief Complaint: Abdominal pain    Subjective:    History of Present Illness:     Yusuf Wetzel is a 69 year old male with past medical of seizure disorder, developmental delay presents with complaint of abdominal pain.    Patient reports dark stool and diarrhea for the last 3 days.  He is also having epigastric abdominal pain around the same time.  He feels it is related to his diet.  He is a poor historian notably.  He denies any NSAID use or alcohol use.  No chest pain or difficulty breathing.    History/Other:    Past Medical History:  Past Medical History:    Epilepsy (HCC)    Stroke (HCC)     Past Surgical History:   History reviewed. No pertinent surgical history.   Family History:   No family history on file.  Social History:    reports that he has never smoked. He has never used smokeless tobacco. He reports that he does not currently use alcohol. He reports that he does not currently use drugs.     Allergies: Allergies[1]    Medications:  Medications Ordered Prior to Encounter[2]    Review of Systems:   A comprehensive review of systems was completed.    Pertinent positives and negatives noted in the HPI.    Objective:   Physical Exam:    /76   Pulse 85   Temp 98.3 °F (36.8 °C) (Oral)   Resp 19   Ht 5' 7\" (1.702 m)   Wt 142 lb (64.4 kg)   SpO2 100%   BMI 22.24 kg/m²   General: No acute distress, Alert  Respiratory: No rhonchi, no wheezes  Cardiovascular: S1, S2. Regular rate and rhythm  Abdomen: Soft, epigastric ttp   Neuro: No new focal deficits  Extremities: No edema      Results:    Labs:      Labs Last 24 Hours:    Recent Labs   Lab 10/17/24  1718   RBC 3.73*   HGB 9.0*   HCT 27.3*   MCV 73.2*   MCH 24.1*   MCHC 33.0   RDW 15.1   NEPRELIM 5.70   WBC 8.0   PLT  265.0       Recent Labs   Lab 10/17/24  1718   *   BUN 37*   CREATSERUM 0.93   EGFRCR 88   CA 8.0*   ALB 3.4      K 3.7      CO2 26.0   ALKPHO 80   AST 19   ALT 15   BILT 0.3   TP 5.6*       No results found for: \"PT\", \"INR\"    No results for input(s): \"TROP\", \"TROPHS\", \"CK\" in the last 168 hours.    No results for input(s): \"TROP\", \"PBNP\" in the last 168 hours.    No results for input(s): \"PCT\" in the last 168 hours.    Imaging: Imaging data reviewed in Epic.    Assessment & Plan:      #Abdominal Pain/Melena, Microcytic Anemia  -PPI BID  -GI on consult  -NPO  -Fe studies    #Seizure Disorder  #Developmental Delay  -Phenytoin         Plan of care discussed with Dr. Milton Parra, DO    Supplementary Documentation:     The 21st Century Cures Act makes medical notes like these available to patients in the interest of transparency. Please be advised this is a medical document. Medical documents are intended to carry relevant information, facts as evident, and the clinical opinion of the practitioner. The medical note is intended as peer to peer communication and may appear blunt or direct. It is written in medical language and may contain abbreviations or verbiage that are unfamiliar.                                       [1] No Known Allergies  [2]   No current facility-administered medications on file prior to encounter.     Current Outpatient Medications on File Prior to Encounter   Medication Sig Dispense Refill    phenytoin  MG Oral Cap Take 2-3 capsules (200-300 mg total) by mouth As Directed. Take 300mg (3 capsules) by mouth every morning and 200mg (2 capsules) by mouth every night at bedtime.

## 2024-10-18 NOTE — PHYSICAL THERAPY NOTE
PHYSICAL THERAPY EVALUATION - INPATIENT     Room Number: 422/422-A  Evaluation Date: 10/18/2024  Type of Evaluation: Initial  Physician Order: PT Eval and Treat    Presenting Problem: Upper GI bleed  Co-Morbidities : developmental delay, seizure disorder  Reason for Therapy: Mobility Dysfunction and Discharge Planning    PHYSICAL THERAPY ASSESSMENT   Patient is a 69 year old male admitted 10/17/2024 for upper GI bleed, plan for EGD later today.   Patient is currently functioning near baseline with bed mobility, transfers, gait, and standing prolonged periods. Prior to admission, patient's baseline is independent per patient.     Patient will benefit from continued skilled PT Services with no additional skilled services but increased support at home.    PLAN  Patient has been evaluated and presents with no skilled Physical Therapy needs at this time.  Patient discharged from Physical Therapy services.  Please re-order if a new functional limitation presents during this admission.    PT Device Recommendation: None    GOALS  Patient was able to achieve the following goals ...    Patient was able to transfer Safely with supervision.    Patient able to ambulate on level surfaces Safely with supervision.      HOME SITUATION  Type of Home: House              Stairs to Bedroom:  (\"I sleep in the basement\")         Lives With:  (\"I live with Magui\")    Drives: No   Patient Regularly Uses: Reading glasses     Prior Level of [place]: Patient with developmental delay at baseline. Reports that he functions independently at baseline. Unable to provide much detail about living environment, answering \"I live with Magui.\" (sp?)    SUBJECTIVE  \"I need to get up to the bathroom so I don't mess the bed.\"     OBJECTIVE  Precautions: Bed/chair alarm;Hard of hearing;Seizure (enteric/contact plus)  Fall Risk: Standard fall risk    WEIGHT BEARING RESTRICTION     PAIN ASSESSMENT             COGNITION  Following Commands:  follows all  commands and directions without difficulty    RANGE OF MOTION AND STRENGTH ASSESSMENT  Upper extremity ROM and strength - NT  Lower extremity ROM is within functional limits     Lower extremity strength is within functional limits     BALANCE  Static Sitting: Good  Dynamic Sitting: Good  Static Standing: Fair +  Dynamic Standing: Fair    ADDITIONAL TESTS                                    ACTIVITY TOLERANCE  Pulse: 76  Heart Rate Source: Monitor                   O2 WALK  Oxygen Therapy  SPO2% on Room Air at Rest: 99    NEUROLOGICAL FINDINGS                        AM-PAC '6-Clicks' INPATIENT SHORT FORM - BASIC MOBILITY  How much difficulty does the patient currently have...  Patient Difficulty: Turning over in bed (including adjusting bedclothes, sheets and blankets)?: None   Patient Difficulty: Sitting down on and standing up from a chair with arms (e.g., wheelchair, bedside commode, etc.): A Little   Patient Difficulty: Moving from lying on back to sitting on the side of the bed?: None   How much help from another person does the patient currently need...   Help from Another: Moving to and from a bed to a chair (including a wheelchair)?: A Little   Help from Another: Need to walk in hospital room?: A Little   Help from Another: Climbing 3-5 steps with a railing?: A Little       AM-PAC Score:  Raw Score: 20   Approx Degree of Impairment: 35.83%   Standardized Score (AM-PAC Scale): 47.67   CMS Modifier (G-Code): CJ    FUNCTIONAL ABILITY STATUS  Gait Assessment   Functional Mobility/Gait Assessment  Gait Assistance: Supervision  Distance (ft): 15 ft. x2  Assistive Device: None  Pattern: Within Functional Limits  Stairs:  (patient declined further ambulation/activity)    Skilled Therapy Provided     Bed Mobility:  Rolling: independent  Supine to sit: supervision   Sit to supine: supervision     Transfer Mobility:  Sit to stand: supervision   Stand to sit: supervision  Gait = 15 ft. X2, supervision, no assistive  device    Therapist's comments: Patient presents to PT semi-supine in bed, currently with NC in place at 2L O2, but able to wean to room air, SpO2 99%. Patient performs bed mobility, transfers, short distance ambulation without any assistive device, standing for use of the toilet and hand hygiene at the sink all with supervision. Patient declined further ambulation/activity and requesting to return to bed. Bed alarm donned. Patient left on room air. All needs placed within reach. Patient is likely functioning near his baseline, thus recommend x1 person assist and gait belt for nursing mobility and no further acute skilled IP PT needs warranted, thus will sign-off. RN updated.     Exercise/Education Provided:  Energy conservation  Functional activity tolerated  Transfer training    Patient End of Session: In bed;Needs met;Call light within reach;RN aware of session/findings;All patient questions and concerns addressed;Hospital anti-slip socks;Alarm set    Patient Evaluation Complexity Level:  History Moderate - 1 or 2 personal factors and/or co-morbidities   Examination of body systems Low -  addressing 1-2 elements   Clinical Presentation Low- Stable   Clinical Decision Making Low Complexity       PT Session Time: 25 minutes  Gait Trainin minutes  Therapeutic Activity: 8 minutes  Neuromuscular Re-education: 0 minutes  Therapeutic Exercise: 0 minutes

## 2024-10-18 NOTE — ED QUICK NOTES
Orders for admission, patient is aware of plan and ready to go upstairs. Any questions, please call ED RN Nessa at extension [phone].     Patient Covid vaccination status: Unvaccinated     COVID Test Ordered in ED: None    COVID Suspicion at Admission: N/A    Running Infusions:  None    Mental Status/LOC at time of transport: A&Ox3, developmental delay    Other pertinent information:   CIWA score: N/A   NIH score:  N/A

## 2024-10-18 NOTE — PLAN OF CARE
NURSING ADMISSION NOTE      Patient admitted via Cart  Oriented to room.  Safety precautions initiated.  Bed in low position.  Call light in reach.    Pt receivd A&Ox4, developmentally delayed, but a good historian. VSS, on 2L O2 via nasal cannula overnight. Afebrile. No c/o pain. Med rec and admission navigator complete. All meds per MAR. Up ambulating in room with assistance. Voids. LR @75mL/h. Stool sample pending collection. Fall precautions in place. Call light within reach.

## 2024-10-18 NOTE — PLAN OF CARE
Patient is alert and oriented x 3. Has some developmental delays noted, but patient is not impulsive or inappropriate. Calm and pleasant behavior, no s/s of distress noted, patient denies pain during comfort rounds. Currently NPO status due to nausea/vomiting but patient is allowed sips with meds. No n/v/diarrhea noted on this shift. Currently on IVF running at 75 ml's/hour. Potassium replaced IV per protocol. VSS. Medications given per mar. Safety precautions maintained this shift including seizure precautions. Call light within reach.    Problem: GASTROINTESTINAL - ADULT  Goal: Minimal or absence of nausea and vomiting  Description: INTERVENTIONS:  - Maintain adequate hydration with IV or PO as ordered and tolerated  - Nasogastric tube to low intermittent suction as ordered  - Evaluate effectiveness of ordered antiemetic medications  - Provide nonpharmacologic comfort measures as appropriate  - Advance diet as tolerated, if ordered  - Obtain nutritional consult as needed  - Evaluate fluid balance  Outcome: Progressing  Goal: Maintains or returns to baseline bowel function  Description: INTERVENTIONS:  - Assess bowel function  - Maintain adequate hydration with IV or PO as ordered and tolerated  - Evaluate effectiveness of GI medications  - Encourage mobilization and activity  - Obtain nutritional consult as needed  - Establish a toileting routine/schedule  - Consider collaborating with pharmacy to review patient's medication profile  Outcome: Progressing     Problem: METABOLIC/FLUID AND ELECTROLYTES - ADULT  Goal: Electrolytes maintained within normal limits  Description: INTERVENTIONS:  - Monitor labs and rhythm and assess patient for signs and symptoms of electrolyte imbalances  - Administer electrolyte replacement as ordered  - Monitor response to electrolyte replacements, including rhythm and repeat lab results as appropriate  - Fluid restriction as ordered  - Instruct patient on fluid and nutrition  restrictions as appropriate  Outcome: Progressing

## 2024-10-18 NOTE — PROGRESS NOTES
[facility] [place]   part of [facility]     Hospitalist Progress Note     Yusuf Wetzel Patient Status:  Inpatient    10/1/1955 MRN [id number]   Location [facility] 4NW-A Attending Caroline Parra,    Hosp Day # 1 PCP WEI THORPE     Chief Complaint: abd pain and dark stools    Subjective:     Patient with abd pain    Objective:    Review of Systems:   A comprehensive review of systems was completed; pertinent positive and negatives stated in subjective.    Vital signs:  Temp:  [98 °F (36.7 °C)-98.3 °F (36.8 °C)] 98 °F (36.7 °C)  Pulse:  [73-91] 73  Resp:  [15-22] 20  BP: (104-138)/(48-76) 104/48  SpO2:  [100 %] 100 %    Physical Exam:    General: No acute distress  Respiratory: No wheezes, no rhonchi  Cardiovascular: S1, S2, regular rate and rhythm  Abdomen: Soft, Non-tender, non-distended, positive bowel sounds  Neuro: No new focal deficits.   Extremities: No edema      Diagnostic Data:    Labs:  Recent Labs   Lab 10/17/24  1718   WBC 8.0   HGB 9.0*   MCV 73.2*   .0       Recent Labs   Lab 10/17/24  1718   *   BUN 37*   CREATSERUM 0.93   CA 8.0*   ALB 3.4      K 3.7      CO2 26.0   ALKPHO 80   AST 19   ALT 15   BILT 0.3   TP 5.6*       Estimated Creatinine Clearance: 67.4 mL/min (based on SCr of 0.93 mg/dL).    No results for input(s): \"TROP\", \"TROPHS\", \"CK\" in the last 168 hours.    No results for input(s): \"PTP\", \"INR\" in the last 168 hours.               Microbiology    No results found for this visit on 10/17/24.      Imaging: Reviewed in Epic.    Medications:    pantoprazole  40 mg Intravenous Q12H    phenytoin ER  200 mg Oral Nightly    phenytoin ER  300 mg Oral Daily       Assessment & Plan:      #Abdominal Pain/Melena, Microcytic Anemia  -PPI BID  -GI on consult  -NPO  -Fe studies     #Seizure Disorder  #Developmental Delay  -Phenytoin       Bianca Fuentes MD    Supplementary Documentation:     Quality:  DVT Mechanical Prophylaxis:     Early ambuation  DVT Pharmacologic  Prophylaxis   Medication   None                Code Status: Not on file  Larry: No urinary catheter in place  Larry Duration (in days):   Central line:    ISHA:     Discharge is dependent on: progress  At this point Mr. Wetzel is expected to be discharge to: home    The 21st Century Cures Act makes medical notes like these available to patients in the interest of transparency. Please be advised this is a medical document. Medical documents are intended to carry relevant information, facts as evident, and the clinical opinion of the practitioner. The medical note is intended as peer to peer communication and may appear blunt or direct. It is written in medical language and may contain abbreviations or verbiage that are unfamiliar.              **Certification      PHYSICIAN Certification of Need for Inpatient Hospitalization - Initial Certification    Patient will require inpatient services that will reasonably be expected to span two midnight's based on the clinical documentation in H+P.   Based on patients current state of illness, I anticipate that, after discharge, patient will require TBD.

## 2024-10-18 NOTE — ED QUICK NOTES
Assuming patient care  Recd report from Elizabeth ESCOBAR    Patient and friend at bedside updated on POC waiting for MD re-assessment/CT results    Bed in low/locked position, on continuous monitoring, call light within reach    Sukhwinder (friend) [phone]

## 2024-10-19 ENCOUNTER — ANESTHESIA (OUTPATIENT)
Dept: ENDOSCOPY | Facility: HOSPITAL | Age: 69
End: 2024-10-19
Payer: MEDICARE

## 2024-10-19 ENCOUNTER — ANESTHESIA EVENT (OUTPATIENT)
Dept: ENDOSCOPY | Facility: HOSPITAL | Age: 69
End: 2024-10-19
Payer: MEDICARE

## 2024-10-19 LAB
ADENOVIRUS F 40/41 PCR: NEGATIVE
ASTROVIRUS PCR: NEGATIVE
C CAYETANENSIS DNA SPEC QL NAA+PROBE: NEGATIVE
C DIFF TOX B STL QL: NEGATIVE
CAMPY SP DNA.DIARRHEA STL QL NAA+PROBE: NEGATIVE
CRYPTOSP DNA SPEC QL NAA+PROBE: NEGATIVE
EAEC PAA PLAS AGGR+AATA ST NAA+NON-PRB: NEGATIVE
EC STX1+STX2 + H7 FLIC SPEC NAA+PROBE: NEGATIVE
ENTAMOEBA HISTOLYTICA PCR: NEGATIVE
EPEC EAE GENE STL QL NAA+NON-PROBE: NEGATIVE
ERYTHROCYTE [DISTWIDTH] IN BLOOD BY AUTOMATED COUNT: 15.6 %
ETEC LTA+ST1A+ST1B TOX ST NAA+NON-PROBE: NEGATIVE
GIARDIA LAMBLIA PCR: NEGATIVE
HCT VFR BLD AUTO: 25.9 %
HGB BLD-MCNC: 8.4 G/DL
MCH RBC QN AUTO: 24.4 PG (ref 26–34)
MCHC RBC AUTO-ENTMCNC: 32.4 G/DL (ref 31–37)
MCV RBC AUTO: 75.3 FL
NOROVIRUS GI/GII PCR: NEGATIVE
P SHIGELLOIDES DNA STL QL NAA+PROBE: NEGATIVE
PLATELET # BLD AUTO: 263 10(3)UL (ref 150–450)
POTASSIUM SERPL-SCNC: 3.8 MMOL/L (ref 3.5–5.1)
RBC # BLD AUTO: 3.44 X10(6)UL
ROTAVIRUS A PCR: NEGATIVE
SALMONELLA DNA SPEC QL NAA+PROBE: NEGATIVE
SAPOVIRUS PCR: NEGATIVE
SHIGELLA SP+EIEC IPAH ST NAA+NON-PROBE: NEGATIVE
V CHOLERAE DNA SPEC QL NAA+PROBE: NEGATIVE
VIBRIO DNA SPEC NAA+PROBE: NEGATIVE
WBC # BLD AUTO: 5.6 X10(3) UL (ref 4–11)
YERSINIA DNA SPEC NAA+PROBE: NEGATIVE

## 2024-10-19 PROCEDURE — 0DB28ZX EXCISION OF MIDDLE ESOPHAGUS, VIA NATURAL OR ARTIFICIAL OPENING ENDOSCOPIC, DIAGNOSTIC: ICD-10-PCS | Performed by: STUDENT IN AN ORGANIZED HEALTH CARE EDUCATION/TRAINING PROGRAM

## 2024-10-19 PROCEDURE — 0DB18ZX EXCISION OF UPPER ESOPHAGUS, VIA NATURAL OR ARTIFICIAL OPENING ENDOSCOPIC, DIAGNOSTIC: ICD-10-PCS | Performed by: STUDENT IN AN ORGANIZED HEALTH CARE EDUCATION/TRAINING PROGRAM

## 2024-10-19 RX ORDER — SODIUM CHLORIDE, SODIUM LACTATE, POTASSIUM CHLORIDE, CALCIUM CHLORIDE 600; 310; 30; 20 MG/100ML; MG/100ML; MG/100ML; MG/100ML
INJECTION, SOLUTION INTRAVENOUS CONTINUOUS
Status: DISCONTINUED | OUTPATIENT
Start: 2024-10-19 | End: 2024-10-19

## 2024-10-19 RX ORDER — NALOXONE HYDROCHLORIDE 0.4 MG/ML
0.08 INJECTION, SOLUTION INTRAMUSCULAR; INTRAVENOUS; SUBCUTANEOUS ONCE AS NEEDED
Status: DISCONTINUED | OUTPATIENT
Start: 2024-10-19 | End: 2024-10-19 | Stop reason: HOSPADM

## 2024-10-19 RX ORDER — ONDANSETRON 2 MG/ML
4 INJECTION INTRAMUSCULAR; INTRAVENOUS ONCE AS NEEDED
Status: DISCONTINUED | OUTPATIENT
Start: 2024-10-19 | End: 2024-10-19 | Stop reason: HOSPADM

## 2024-10-19 RX ORDER — LIDOCAINE HYDROCHLORIDE 10 MG/ML
INJECTION, SOLUTION EPIDURAL; INFILTRATION; INTRACAUDAL; PERINEURAL AS NEEDED
Status: DISCONTINUED | OUTPATIENT
Start: 2024-10-19 | End: 2024-10-19 | Stop reason: SURG

## 2024-10-19 RX ADMIN — LIDOCAINE HYDROCHLORIDE 5 ML: 10 INJECTION, SOLUTION EPIDURAL; INFILTRATION; INTRACAUDAL; PERINEURAL at 11:41:00

## 2024-10-19 NOTE — ANESTHESIA PREPROCEDURE EVALUATION
PRE-OP EVALUATION    Patient Name: Yusuf Wetzel    Admit Diagnosis: Esophagitis [K20.90]  Upper GI bleed [K92.2]  Anemia, unspecified type [D64.9]    Pre-op Diagnosis: Melena [K92.1]    ESOPHAGOGASTRODUODENOSCOPY    Anesthesia Procedure: ESOPHAGOGASTRODUODENOSCOPY    Surgeons and Role:     * Alex Dupree MD - Primary    Pre-op vitals reviewed.  Temp: 98.7 °F (37.1 °C)  Pulse: 81  Resp: 19  BP: 134/71  SpO2: 99 %  Body mass index is 22.29 kg/m².    Current medications reviewed.  Hospital Medications:   [COMPLETED] potassium chloride 40 mEq in 250mL sodium chloride 0.9% IVPB premix  40 mEq Intravenous Once    [COMPLETED] polyethylene glycol-electrolyte (Golytely) 236 g oral solution 2,000 mL  2,000 mL Oral Once    And    [COMPLETED] polyethylene glycol-electrolyte (Golytely) 236 g oral solution 2,000 mL  2,000 mL Oral Once    [COMPLETED] sodium chloride 0.9 % IV bolus 1,000 mL  1,000 mL Intravenous Once    [COMPLETED] ondansetron (Zofran) 4 MG/2ML injection 4 mg  4 mg Intravenous Once    [COMPLETED] iopamidol 76% (ISOVUE-370) injection for power injector  80 mL Intravenous ONCE PRN    [COMPLETED] pantoprazole (Protonix) 40 mg in sodium chloride 0.9% PF 10 mL IV push  40 mg Intravenous Once    [] sodium chloride 0.9% infusion   Intravenous Continuous    pantoprazole (Protonix) 40 mg in sodium chloride 0.9% PF 10 mL IV push  40 mg Intravenous Q12H    phenytoin ER (Dilantin) cap 200 mg  200 mg Oral Nightly    phenytoin ER (Dilantin) cap 300 mg  300 mg Oral Daily    acetaminophen (Tylenol Extra Strength) tab 1,000 mg  1,000 mg Oral Q8H PRN    melatonin tab 3 mg  3 mg Oral Nightly PRN    lactated ringers infusion   Intravenous Continuous    ondansetron (Zofran) 4 MG/2ML injection 4 mg  4 mg Intravenous Q6H PRN    metoclopramide (Reglan) 5 mg/mL injection 10 mg  10 mg Intravenous Q8H PRN    polyethylene glycol (PEG 3350) (Miralax) 17 g oral packet 17 g  17 g Oral Daily PRN    sennosides (Senokot) tab 17.2 mg   17.2 mg Oral Nightly PRN    bisacodyl (Dulcolax) 10 MG rectal suppository 10 mg  10 mg Rectal Daily PRN    fleet enema (Fleet) rectal enema 133 mL  1 enema Rectal Once PRN    benzonatate (Tessalon) cap 200 mg  200 mg Oral TID PRN    guaiFENesin (Robitussin) 100 MG/5 ML oral liquid 200 mg  200 mg Oral Q4H PRN    glycerin-hypromellose- (Artificial Tears) 0.2-0.2-1 % ophthalmic solution 1 drop  1 drop Both Eyes QID PRN    sodium chloride (Saline Mist) 0.65 % nasal solution 1 spray  1 spray Each Nare Q3H PRN       Outpatient Medications:   Prescriptions Prior to Admission[1]    Allergies: Patient has no known allergies.      Anesthesia Evaluation        Anesthetic Complications           GI/Hepatic/Renal    Negative GI/hepatic/renal ROS.                             Cardiovascular    Negative cardiovascular ROS.    Exercise tolerance: good     MET: >4                                           Endo/Other    Negative endo/other ROS.                              Pulmonary    Negative pulmonary ROS.                       Neuro/Psych  Comment: Developmentally delayed        (+) CVA                            History reviewed. No pertinent surgical history.  Social History     Socioeconomic History    Marital status: Single   Tobacco Use    Smoking status: Never    Smokeless tobacco: Never   Vaping Use    Vaping status: Never Used   Substance and Sexual Activity    Alcohol use: Not Currently    Drug use: Not Currently     History   Drug Use Unknown     Available pre-op labs reviewed.  Lab Results   Component Value Date    WBC 5.6 10/19/2024    RBC 3.44 (L) 10/19/2024    HGB 8.4 (L) 10/19/2024    HCT 25.9 (L) 10/19/2024    MCV 75.3 (L) 10/19/2024    MCH 24.4 (L) 10/19/2024    MCHC 32.4 10/19/2024    RDW 15.6 10/19/2024    .0 10/19/2024     Lab Results   Component Value Date     10/18/2024    K 3.8 10/19/2024     10/18/2024    CO2 26.0 10/18/2024    BUN 22 10/18/2024    CREATSERUM 0.74 10/18/2024    GLU  95 10/18/2024    CA 7.8 (L) 10/18/2024            Airway      Mallampati: II  Mouth opening: >3 FB  TM distance: 4 - 6 cm   Cardiovascular    Cardiovascular exam normal.         Dental  Comment: Poor dentition           Pulmonary    Pulmonary exam normal.                 Other findings              ASA: 2   Plan: MAC  NPO status verified and patient meets guidelines.    Post-procedure pain management plan discussed with surgeon and patient.      Plan/risks discussed with: patient                Present on Admission:  **None**             [1]   Medications Prior to Admission   Medication Sig Dispense Refill Last Dose/Taking    phenytoin  MG Oral Cap Take 3 capsules (300 mg total) by mouth every morning. Take 300mg (3 capsules) by mouth every morning.   Taking    phenytoin  MG Oral Cap Take 2 capsules (200 mg total) by mouth at bedtime. Take 200mg (2 capsules) by mouth every evening   10/17/2024 at 11:30 AM

## 2024-10-19 NOTE — PLAN OF CARE
Pt received A&Ox4, developmentally delayed. VSS. Afebrile. No c/o pain overnight. All meds per MAR. LR infusing @ 75mL/h. Pt NPO for EGD/colonoscopy this morning. Tolerated Golytely. Pt had multiple black stools overnight. Will continue POC. Rounds made. Fall precautions in place. Call light within reach.     Problem: GASTROINTESTINAL - ADULT  Goal: Minimal or absence of nausea and vomiting  Description: INTERVENTIONS:  - Maintain adequate hydration with IV or PO as ordered and tolerated  - Nasogastric tube to low intermittent suction as ordered  - Evaluate effectiveness of ordered antiemetic medications  - Provide nonpharmacologic comfort measures as appropriate  - Advance diet as tolerated, if ordered  - Obtain nutritional consult as needed  - Evaluate fluid balance  Outcome: Progressing  Goal: Maintains or returns to baseline bowel function  Description: INTERVENTIONS:  - Assess bowel function  - Maintain adequate hydration with IV or PO as ordered and tolerated  - Evaluate effectiveness of GI medications  - Encourage mobilization and activity  - Obtain nutritional consult as needed  - Establish a toileting routine/schedule  - Consider collaborating with pharmacy to review patient's medication profile  Outcome: Progressing     Problem: METABOLIC/FLUID AND ELECTROLYTES - ADULT  Goal: Electrolytes maintained within normal limits  Description: INTERVENTIONS:  - Monitor labs and rhythm and assess patient for signs and symptoms of electrolyte imbalances  - Administer electrolyte replacement as ordered  - Monitor response to electrolyte replacements, including rhythm and repeat lab results as appropriate  - Fluid restriction as ordered  - Instruct patient on fluid and nutrition restrictions as appropriate  Outcome: Progressing

## 2024-10-19 NOTE — OPERATIVE REPORT
EGD operative report  Patient Name: Yusuf Wetzel  Date: 10/19/2024  Procedure: Esophagogastroduodenoscopy with biopsy   Pre-Op Diagnosis: hematemesis, CT scan showing esophageal thickening, chronic anemia  Post-Op Diagnosis: ulcerative reflux esophagitis, hiatal hernia, suspected Escobar's esophagus (long-segment)  Attending: Alex Dupree M.D.  Consent:  The risks, benefits, and alternatives were discussed with the patient / POA.  Risks included, but were not limited to, bleeding, perforation, medication effects, cardiac arrhythmias, and aspiration.  After all questions were answered to their satisfaction, a signed, informed, and witnessed consent was obtained.  Sedation: Monitored Anesthesia Care  Monitoring:  Pulsoximetry, pulse, respirations, and blood pressure were monitored throughout the entire procedure  Procedure: After achieving adequate sedation and placing the patient in the left lateral decubitus position, the lubricated upper endoscope was introduced into the mouth and advanced to the descending duodenum.  The endoscope was then withdrawn into the gastric antrum and placed in a retroflexed position.  The endoscope was then righted, and air was suctioned from the stomach.  The endoscope was then withdrawn from the patient, with careful visual inspection of the mucosa revealing no additional pathologic findings.  The patient tolerated the procedure without apparent procedural complications.  The patient left the procedure room in stable condition for recovery.    Findings:   Esophagus: The esophagus was grossly abnormal.  The squamo-columnar junction appears to be at 24 cm from the incisors.  The proximal gastric folds, which identify the GE junction, appear to be at 40 cm.  There is circumferential mucosal ulceration with contact bleeding, mucosal sloughing, and exudate seen from the GE junction (40 cm) to the 34 cm chris.  Random biopsies obtained from the mid and proximal esophagus.  The ulcerated  mucosa was not biopsied at this time.  GE junction: GE junction is at 40 cm.  The diaphragmatic impression is at 43 cm, representing a 3 cm hiatal hernia.    Stomach:  The gastric body, antrum, fundus, cardia, and angularis were normal.     Duodenum: The duodenal bulb, post-bulbar duodenum, and descending duodenum were normal.    Impression: Findings as above  Recommendations:   PPI BID IV  When tolerating PO, can change to PPI PO, BID-ac  Should be continued indefinitely  Repeat EGD in 4-6 weeks as outpatient to assess healing and definitively assess for Escobar's esophagus, as this was limited today due to the severity of inflammation  Should remain upright in chair for minimum of 4 hours after meals, to avoid reflux due to laying in recumbent position  Follow-up in office in GI clinic with NP in 2 weeks after discharge and can discuss symptoms, desire to add colonoscopy to plan at the time of the outpatient EGD (as part of anemia evaluation).  Pt was not able to complete prep during this admission, but can certainly be re-attempted.      Alex Dupree MD

## 2024-10-19 NOTE — ANESTHESIA POSTPROCEDURE EVALUATION
[facility]    Yusuf Wetzel Patient Status:  Inpatient   Age/Gender 69 year old male MRN [id number]   Location [facility] Attending Caroline Parra DO   Hosp Day # 2 PCP WEI THORPE       Anesthesia Post-op Note    ESOPHAGOGASTRODUODENOSCOPY with biopsies    Procedure Summary       Date: 10/19/24 Room / Location:  ENDOSCOPY 03 / [facility] END[facility]    Anesthesia Start: 1139 Anesthesia Stop: 1158    Procedure: ESOPHAGOGASTRODUODENOSCOPY with biopsies Diagnosis:       Melena      (Hiatal hernia, erosive esophagitis, Barretts)    Surgeons: Alex Dupree MD Anesthesiologist: Jeff Milan MD    Anesthesia Type: MAC ASA Status: 2            Anesthesia Type: MAC    Vitals Value Taken Time   BP 90/42 10/19/24 1157   Temp  10/19/24 1158   Pulse 81 10/19/24 1157   Resp 21 10/19/24 1157   SpO2 96 % 10/19/24 1157   Vitals shown include unfiled device data.    Patient Location: Endoscopy    Anesthesia Type: MAC    Airway Patency: patent    Postop Pain Control: adequate    Mental Status: preanesthetic baseline    Nausea/Vomiting: none    Cardiopulmonary/Hydration status: stable euvolemic    Complications: no apparent anesthesia related complications    Postop vital signs: stable    Dental Exam: Unchanged from Preop    Patient to be discharged home when criteria met.

## 2024-10-19 NOTE — PROGRESS NOTES
[facility]   part of [facility]     Hospitalist Progress Note     Yusuf Wetzel Patient Status:  Inpatient    10/1/1955 MRN [id number]   Location [facility] [place] 4NW-A Attending Caroline Parra,    Hosp Day # 2 PCP WEI THORPE     Chief Complaint: abd pain and dark stools    Subjective:     Patient with abd pain    Objective:    Review of Systems:   A comprehensive review of systems was completed; pertinent positive and negatives stated in subjective.    Vital signs:  Temp:  [98 °F (36.7 °C)-99 °F (37.2 °C)] 99 °F (37.2 °C)  Pulse:  [70-82] 81  Resp:  [14-20] 19  BP: (103-144)/(55-69) 144/69  SpO2:  [98 %-100 %] 98 %    Physical Exam:    General: No acute distress  Respiratory: No wheezes, no rhonchi  Cardiovascular: S1, S2, regular rate and rhythm  Abdomen: Soft, Non-tender, non-distended, positive bowel sounds  Neuro: No new focal deficits.   Extremities: No edema      Diagnostic Data:    Labs:  Recent Labs   Lab 10/17/24  1718 10/18/24  0653   WBC 8.0 4.8   HGB 9.0* 7.9*   MCV 73.2* 75.7*   .0 226.0       Recent Labs   Lab 10/17/24  1718 10/18/24  0653   * 95   BUN 37* 22   CREATSERUM 0.93 0.74   CA 8.0* 7.8*   ALB 3.4  --     141   K 3.7 3.4*    109   CO2 26.0 26.0   ALKPHO 80  --    AST 19  --    ALT 15  --    BILT 0.3  --    TP 5.6*  --        Estimated Creatinine Clearance: 84.7 mL/min (based on SCr of 0.74 mg/dL).    No results for input(s): \"TROP\", \"TROPHS\", \"CK\" in the last 168 hours.    No results for input(s): \"PTP\", \"INR\" in the last 168 hours.               Microbiology    No results found for this visit on 10/17/24.      Imaging: Reviewed in Epic.    Medications:    pantoprazole  40 mg Intravenous Q12H    phenytoin ER  200 mg Oral Nightly    phenytoin ER  300 mg Oral Daily       Assessment & Plan:      #Abdominal Pain/Melena, Microcytic Anemia  -PPI BID  -GI on consult  -NPO  -Fe studies     #Seizure Disorder  #Developmental Delay  -Phenytoin       Bianca Fuentes  MD    Supplementary Documentation:     Quality:  DVT Mechanical Prophylaxis:     Early ambuation  DVT Pharmacologic Prophylaxis   Medication   None                Code Status: Not on file  Larry: No urinary catheter in place  Larry Duration (in days):   Central line:    ISHA:     Discharge is dependent on: progress  At this point Mr. Wetzel is expected to be discharge to: home    The 21st Century Cures Act makes medical notes like these available to patients in the interest of transparency. Please be advised this is a medical document. Medical documents are intended to carry relevant information, facts as evident, and the clinical opinion of the practitioner. The medical note is intended as peer to peer communication and may appear blunt or direct. It is written in medical language and may contain abbreviations or verbiage that are unfamiliar.              **Certification      PHYSICIAN Certification of Need for Inpatient Hospitalization - Initial Certification    Patient will require inpatient services that will reasonably be expected to span two midnight's based on the clinical documentation in H+P.   Based on patients current state of illness, I anticipate that, after discharge, patient will require TBD.

## 2024-10-19 NOTE — PLAN OF CARE
Patient is A/O x 4. VSS. Afebrile. Room air. No complaints of pain. NPO for EGD this am. EGD completed (see notes) Clear liquid diet resumed; tolerated well. Ambulatory. Voids. All medications given per MAR. IVF stopped, PIV saline locked. Safety precautions in place. Call light within reach.  Stool sample collected and sent. See results review.  Problem: GASTROINTESTINAL - ADULT  Goal: Minimal or absence of nausea and vomiting  Description: INTERVENTIONS:  - Maintain adequate hydration with IV or PO as ordered and tolerated  - Nasogastric tube to low intermittent suction as ordered  - Evaluate effectiveness of ordered antiemetic medications  - Provide nonpharmacologic comfort measures as appropriate  - Advance diet as tolerated, if ordered  - Obtain nutritional consult as needed  - Evaluate fluid balance  Outcome: Progressing  Goal: Maintains or returns to baseline bowel function  Description: INTERVENTIONS:  - Assess bowel function  - Maintain adequate hydration with IV or PO as ordered and tolerated  - Evaluate effectiveness of GI medications  - Encourage mobilization and activity  - Obtain nutritional consult as needed  - Establish a toileting routine/schedule  - Consider collaborating with pharmacy to review patient's medication profile  Outcome: Progressing     Problem: METABOLIC/FLUID AND ELECTROLYTES - ADULT  Goal: Electrolytes maintained within normal limits  Description: INTERVENTIONS:  - Monitor labs and rhythm and assess patient for signs and symptoms of electrolyte imbalances  - Administer electrolyte replacement as ordered  - Monitor response to electrolyte replacements, including rhythm and repeat lab results as appropriate  - Fluid restriction as ordered  - Instruct patient on fluid and nutrition restrictions as appropriate  Outcome: Progressing

## 2024-10-20 RX ORDER — PANTOPRAZOLE SODIUM 40 MG/1
40 TABLET, DELAYED RELEASE ORAL
Status: DISCONTINUED | OUTPATIENT
Start: 2024-10-21 | End: 2024-10-22

## 2024-10-20 RX ORDER — PANTOPRAZOLE SODIUM 40 MG/1
40 TABLET, DELAYED RELEASE ORAL
Qty: 180 TABLET | Refills: 3 | Status: SHIPPED | OUTPATIENT
Start: 2024-10-20 | End: 2025-10-15

## 2024-10-20 NOTE — PLAN OF CARE
Pt received A&Ox4. VSS. Afebrile. All meds per MAR. Pt up ambulating to bathroom. Pt tolerating clear liquid diet. Needs met. Call light within reach.    Problem: GASTROINTESTINAL - ADULT  Goal: Minimal or absence of nausea and vomiting  Description: INTERVENTIONS:  - Maintain adequate hydration with IV or PO as ordered and tolerated  - Nasogastric tube to low intermittent suction as ordered  - Evaluate effectiveness of ordered antiemetic medications  - Provide nonpharmacologic comfort measures as appropriate  - Advance diet as tolerated, if ordered  - Obtain nutritional consult as needed  - Evaluate fluid balance  Outcome: Progressing  Goal: Maintains or returns to baseline bowel function  Description: INTERVENTIONS:  - Assess bowel function  - Maintain adequate hydration with IV or PO as ordered and tolerated  - Evaluate effectiveness of GI medications  - Encourage mobilization and activity  - Obtain nutritional consult as needed  - Establish a toileting routine/schedule  - Consider collaborating with pharmacy to review patient's medication profile  Outcome: Progressing     Problem: METABOLIC/FLUID AND ELECTROLYTES - ADULT  Goal: Electrolytes maintained within normal limits  Description: INTERVENTIONS:  - Monitor labs and rhythm and assess patient for signs and symptoms of electrolyte imbalances  - Administer electrolyte replacement as ordered  - Monitor response to electrolyte replacements, including rhythm and repeat lab results as appropriate  - Fluid restriction as ordered  - Instruct patient on fluid and nutrition restrictions as appropriate  Outcome: Progressing

## 2024-10-20 NOTE — PROGRESS NOTES
[facility]   part of [facility]     Hospitalist Progress Note     Yusuf Wetzel Patient Status:  Inpatient    10/1/1955 MRN [id number]   Location [facility] 4NW-A Attending Caroline Parra,    Hosp Day # 3 PCP WEI THORPE     Chief Complaint: abd pain and dark stools    Subjective:     Patient denies abd pain nausea vomiting    Objective:    Review of Systems:   A comprehensive review of systems was completed; pertinent positive and negatives stated in subjective.    Vital signs:  Temp:  [98.1 °F (36.7 °C)-99.9 °F (37.7 °C)] 98.4 °F (36.9 °C)  Pulse:  [] 75  Resp:  [11-23] 17  BP: ()/(42-64) 129/59  SpO2:  [96 %-100 %] 97 %    Physical Exam:    General: No acute distress  Respiratory: No wheezes, no rhonchi  Cardiovascular: S1, S2, regular rate and rhythm  Abdomen: Soft, Non-tender, non-distended, positive bowel sounds  Neuro: No new focal deficits.   Extremities: No edema      Diagnostic Data:    Labs:  Recent Labs   Lab 10/17/24  1718 10/18/24  0653 10/19/24  0710   WBC 8.0 4.8 5.6   HGB 9.0* 7.9* 8.4*   MCV 73.2* 75.7* 75.3*   .0 226.0 263.0       Recent Labs   Lab 10/17/24  1718 10/18/24  0653 10/19/24  0628   * 95  --    BUN 37* 22  --    CREATSERUM 0.93 0.74  --    CA 8.0* 7.8*  --    ALB 3.4  --   --     141  --    K 3.7 3.4* 3.8    109  --    CO2 26.0 26.0  --    ALKPHO 80  --   --    AST 19  --   --    ALT 15  --   --    BILT 0.3  --   --    TP 5.6*  --   --        Estimated Creatinine Clearance: 84.7 mL/min (based on SCr of 0.74 mg/dL).    No results for input(s): \"TROP\", \"TROPHS\", \"CK\" in the last 168 hours.    No results for input(s): \"PTP\", \"INR\" in the last 168 hours.               Microbiology    No results found for this visit on 10/17/24.      Imaging: Reviewed in Epic.    Medications:    pantoprazole  40 mg Intravenous Q12H    phenytoin ER  200 mg Oral Nightly    phenytoin ER  300 mg Oral Daily       Assessment & Plan:      #Abdominal  Pain/Melena, Microcytic Anemia  -PPI BID  -s/p egd  -ADAT  -Fe studies     #Seizure Disorder  #Developmental Delay  -Phenytoin       Bianca Fuentes MD    Supplementary Documentation:     Quality:  DVT Mechanical Prophylaxis:     Early ambuation  DVT Pharmacologic Prophylaxis   Medication   None                Code Status: Not on file  Larry: No urinary catheter in place  Larry Duration (in days):   Central line:    ISHA:     Discharge is dependent on: progress  At this point Mr. Wetzel is expected to be discharge to: home    The 21st Century Cures Act makes medical notes like these available to patients in the interest of transparency. Please be advised this is a medical document. Medical documents are intended to carry relevant information, facts as evident, and the clinical opinion of the practitioner. The medical note is intended as peer to peer communication and may appear blunt or direct. It is written in medical language and may contain abbreviations or verbiage that are unfamiliar.              **Certification      PHYSICIAN Certification of Need for Inpatient Hospitalization - Initial Certification    Patient will require inpatient services that will reasonably be expected to span two midnight's based on the clinical documentation in H+P.   Based on patients current state of illness, I anticipate that, after discharge, patient will require TBD.

## 2024-10-20 NOTE — PLAN OF CARE
Problem: Patient/Family Goals  Goal: Patient/Family Long Term Goal  Description: Patient's Long Term Goal: Stay healthy    Interventions:  - Follow up with PCP  - See additional Care Plan goals for specific interventions  Outcome: Progressing  Goal: Patient/Family Short Term Goal  Description: Patient's Short Term Goal: Have no further bleeding     Interventions:   - GI Consult  -PPI  - See additional Care Plan goals for specific interventions  Outcome: Progressing     Problem: GASTROINTESTINAL - ADULT  Goal: Minimal or absence of nausea and vomiting  Description: INTERVENTIONS:  - Maintain adequate hydration with IV or PO as ordered and tolerated  - Nasogastric tube to low intermittent suction as ordered  - Evaluate effectiveness of ordered antiemetic medications  - Provide nonpharmacologic comfort measures as appropriate  - Advance diet as tolerated, if ordered  - Obtain nutritional consult as needed  - Evaluate fluid balance  Outcome: Progressing  Goal: Maintains or returns to baseline bowel function  Description: INTERVENTIONS:  - Assess bowel function  - Maintain adequate hydration with IV or PO as ordered and tolerated  - Evaluate effectiveness of GI medications  - Encourage mobilization and activity  - Obtain nutritional consult as needed  - Establish a toileting routine/schedule  - Consider collaborating with pharmacy to review patient's medication profile  Outcome: Progressing     Problem: METABOLIC/FLUID AND ELECTROLYTES - ADULT  Goal: Electrolytes maintained within normal limits  Description: INTERVENTIONS:  - Monitor labs and rhythm and assess patient for signs and symptoms of electrolyte imbalances  - Administer electrolyte replacement as ordered  - Monitor response to electrolyte replacements, including rhythm and repeat lab results as appropriate  - Fluid restriction as ordered  - Instruct patient on fluid and nutrition restrictions as appropriate  Outcome: Progressing          Pt is alert and  oriented x 4 with delayed response. Vitals stable. No C/o pain or discomfort. Tolerating Clear liquid diet. Notified  with orders to advance diet as tolerated.  Soft diet ordered. Fall and Seizure precautions observed . Plan of care updated to the pt. Will continue to monitor.

## 2024-10-21 LAB
ERYTHROCYTE [DISTWIDTH] IN BLOOD BY AUTOMATED COUNT: 15.7 %
HCT VFR BLD AUTO: 27.4 %
HGB BLD-MCNC: 8.5 G/DL
MCH RBC QN AUTO: 23.9 PG (ref 26–34)
MCHC RBC AUTO-ENTMCNC: 31 G/DL (ref 31–37)
MCV RBC AUTO: 77.2 FL
PLATELET # BLD AUTO: 296 10(3)UL (ref 150–450)
RBC # BLD AUTO: 3.55 X10(6)UL
WBC # BLD AUTO: 6.4 X10(3) UL (ref 4–11)

## 2024-10-21 NOTE — OCCUPATIONAL THERAPY NOTE
OCCUPATIONAL THERAPY EVALUATION - INPATIENT    Room Number: 422/422-A  Evaluation Date: 10/21/2024     Type of Evaluation: Initial  Presenting Problem: upper GI bleed    Physician Order: IP Consult to Occupational Therapy  Reason for Therapy:  ADL/IADL Dysfunction and Discharge Planning    OCCUPATIONAL THERAPY ASSESSMENT   Patient is a 69 year old male admitted on 10/17/2024 with Presenting Problem: upper GI bleed. Co-Morbidities : developmental delay, seizure disorder  Patient is currently functioning near baseline with toileting, lower body dressing, bed mobility, transfers, static sitting balance, dynamic sitting balance, static standing balance, and dynamic standing balance.  Prior to admission, patient's baseline is independent with BADLs and mobility.  Patient met all OT goals at supervision level.  Patient reports no further questions/concerns at this time.     Patient will benefit from home at discharge    Recommendations for nursing staff:   Transfers: 1 person, no device  Toileting location: Toilet    EVALUATION SESSION:  Patient at start of session: supine    FUNCTIONAL TRANSFER ASSESSMENT  Sit to Stand: Edge of Bed  Edge of Bed: Supervision    BED MOBILITY  Supine to Sit : Independent  Sit to Supine (OT): Independent    BALANCE ASSESSMENT  Static Standing: Independent    FUNCTIONAL ADL ASSESSMENT  LB Dressing Seated: Independent  LB Dressing Standing: Supervision    ACTIVITY TOLERANCE: stable                         O2 SATURATIONS       COGNITION  Arousal/Alertness:  appropriate responses to stimuli  Attention Span:  appears intact  Following Commands:  follows one step commands without difficulty  Initiation: appears intact  Motor Planning: intact  Appears to be at baseline    COGNITION ASSESSMENTS     Upper Extremity:   ROM: within functional limits   Strength: is within functional limits a observed  Coordination:  Gross motor: wfl  Fine motor: wfl  Sensation: denied deficits    EDUCATION  PROVIDED  Patient Education : Role of Occupational Therapy; Plan of Care  Patient's Response to Education: Verbalized Understanding    Equipment used: gait belt  Demonstrates functional use    Therapist comments:   Explained OT purpose of evaluation. Patient stated he has no problems and there is nothing wrong with him. He was agreeable to sit up at side of bed, doff and don a sock. He competed simulated household distances in hallway with SBA. Patient was left in bed with needs met, call light in reach, and RN aware of session.     Patient End of Session: Hospital anti-slip socks;All patient questions and concerns addressed;RN aware of session/findings;Call light within reach;Needs met;In bed    OCCUPATIONAL PROFILE    HOME SITUATION  Type of Home: House     Lives With: Friend(s) (and her spouse)       Shower/Tub and Equipment: Walk-in shower             Drives: No  Patient Regularly Uses: Reading glasses    Prior Level of Function:   Lives in a house with his friend?boss? , Magui, and her spouse    SUBJECTIVE  I don't watch TV all day until 8 o'clock.  Why? Because that's stupid to sit on your butt all day, that's no good!\"    PAIN ASSESSMENT  Ratin          OBJECTIVE  Precautions: Bed/chair alarm  Fall Risk: Standard fall risk    WEIGHT BEARING RESTRICTION       AM-PAC ‘6-Clicks’ Inpatient Daily Activity Short Form  -   Putting on and taking off regular lower body clothing?: A Little  -   Bathing (including washing, rinsing, drying)?: A Little  -   Toileting, which includes using toilet, bedpan or urinal? : None  -   Putting on and taking off regular upper body clothing?: None  -   Taking care of personal grooming such as brushing teeth?: None  -   Eating meals?: None    AM-PAC Score:  Score: 22  Approx Degree of Impairment: 25.8%  Standardized Score (AM-PAC Scale): 47.1    ADDITIONAL TESTS     NEUROLOGICAL FINDINGS      PLAN   Patient has been evaluated and presents with no skilled Occupational Therapy needs  at this time.  Patient discharged from Occupational Therapy services.  Please re-order if a new functional limitation presents during this admission.         Patient Evaluation Complexity Level:   Occupational Profile/Medical History LOW - Brief history including review of medical or therapy records    Specific performance deficits impacting engagement in ADL/IADL LOW  1 - 3 performance deficits    Client Assessment/Performance Deficits LOW - No comorbidities nor modifications of tasks    Clinical Decision Making LOW - Analysis of occupational profile, problem-focused assessments, limited treatment options    Overall Complexity LOW     OT Session Time: 11 minutes    Therapeutic Activity: 8 minutes

## 2024-10-21 NOTE — CDS QUERY
Dear Dr. Fuentes:  Can you please further clarify the diagnosis of anemia  [ x  ] Anemia due to acute blood loss on chronic anemia    [   ] Anemia due to acute blood loss.    [   ] Other:     Documentation from the Medical Record:     Clinical Indicators:   ___10/17 69M to ER with c/o dark vomit. HGB 9/HCT 27.3. hemoccult +  ___08/2023 Historical HGB/HCT: 13.1/40.3  ___GI CONSULT: GI being consulted today for nausea, dark emesis, melena, and diarrhea. He is noted to be anemic with iron deficiency with ferritin of 6.This may be secondary to upper GI bleed or possibly dark stools from Pepto-Bismol. CT on admission concerning for esophagitis, gastritis/gastric lesion, and mild colitis will need bidirectional endoscopic evaluation.  ___10/18: HGB/HCT: 7.9/24  ___10/19: HGB/HCT: 8.4/25.9  ___10/20 GI Consult Note: Impression: Severe reflux esophagitis Possible Escobar's esophagus Hiatal hernia Bloody emesis (caused by above) Chronic anemia Recommendations: Continue BID PPI Will need to continue as outpatient indefinitely  ___10/21: HGB/HCT: 8.5/27.4  ___10/21 Hospitalist Note: Assessment & Plan: Abdominal Pain/Melena, Microcytic Anemia -PPI BID -s/p egd -Escobar's esophagus, Hiatal Hernia    Potential Risk Factors: Hematemesis, Melena, Severe reflux esophagitis Possible Escobar's esophagus Hiatal hernia  Treatment: Repeat Labs, GI Consult, EGD, PPI           Use of terms such as suspected, possible, or probable (associated with a specific diagnosis that is being evaluated, monitored, or treated as if it exists) are acceptable and can be coded in the inpatient setting, when documented at the time of discharge.     Please add any additional documentation to your progress note and continue to document this through discharge.      For questions regarding this query, please contact Clinical Documentation:  Devorah Gates RN  [phone] [email or web address]. Thank you.  THIS FORM IS A PERMANENT PART OF THE MEDICAL RECORD

## 2024-10-21 NOTE — PROGRESS NOTES
[facility]  Report of GI Progress Note    Yusuf AGUILAR Damari Patient Status:  Inpatient    10/1/1955 MRN [id number]   [facility] 4NW-A Attending Caroline Parra,    Hosp Day # 3 PCP WEI THORPE     Date of Admission:  10/17/2024  PCP: WEI THORPE    Reason for Visit:   Hematemesis, nausea and vomiting    History of Present Illness:   Patient is a 69 year old male who was admitted to the hospital for Upper GI bleed:    EGD yest showed severe esophagitis, hiatal hernia.  Today, pt reports pain is better in lower chest, now has hiccups after eating.  Ate regular meal, bed still reclined though not fully flat.  PPI changed to BID.      Current Medications:  Current Facility-Administered Medications   Medication Dose Route Frequency    pantoprazole (Protonix) 40 mg in sodium chloride 0.9% PF 10 mL IV push  40 mg Intravenous Q12H    phenytoin ER (Dilantin) cap 200 mg  200 mg Oral Nightly    phenytoin ER (Dilantin) cap 300 mg  300 mg Oral Daily    acetaminophen (Tylenol Extra Strength) tab 1,000 mg  1,000 mg Oral Q8H PRN    melatonin tab 3 mg  3 mg Oral Nightly PRN    ondansetron (Zofran) 4 MG/2ML injection 4 mg  4 mg Intravenous Q6H PRN    metoclopramide (Reglan) 5 mg/mL injection 10 mg  10 mg Intravenous Q8H PRN    polyethylene glycol (PEG 3350) (Miralax) 17 g oral packet 17 g  17 g Oral Daily PRN    sennosides (Senokot) tab 17.2 mg  17.2 mg Oral Nightly PRN    bisacodyl (Dulcolax) 10 MG rectal suppository 10 mg  10 mg Rectal Daily PRN    fleet enema (Fleet) rectal enema 133 mL  1 enema Rectal Once PRN    benzonatate (Tessalon) cap 200 mg  200 mg Oral TID PRN    guaiFENesin (Robitussin) 100 MG/5 ML oral liquid 200 mg  200 mg Oral Q4H PRN    glycerin-hypromellose- (Artificial Tears) 0.2-0.2-1 % ophthalmic solution 1 drop  1 drop Both Eyes QID PRN    sodium chloride (Saline Mist) 0.65 % nasal solution 1 spray  1 spray Each Nare Q3H PRN       Allergies  Allergies[1]      Physical Exam:   Blood  pressure 132/68, pulse 99, temperature (!) 100.6 °F (38.1 °C), temperature source Oral, resp. rate 16, height 5' 7\" (1.702 m), weight 142 lb 4.8 oz (64.5 kg), SpO2 95%.    GENERAL: NAD, oriented x 3, pleasant  ABD: S/NT/ND    Results:     Laboratory Data:  Lab Results   Component Value Date    WBC 5.6 10/19/2024    HGB 8.4 (L) 10/19/2024    .0 10/19/2024    CREATSERUM 0.74 10/18/2024    BUN 22 10/18/2024     10/18/2024    K 3.8 10/19/2024    CO2 26.0 10/18/2024    CA 7.8 (L) 10/18/2024    ALB 3.4 10/17/2024    ALKPHO 80 10/17/2024    TP 5.6 (L) 10/17/2024    AST 19 10/17/2024    ALT 15 10/17/2024    BILT 0.3 10/17/2024    LIP 25 10/17/2024    MG 1.9 10/18/2024         No results for input(s): \"URINE\", \"CULTI\", \"BLDSMR\" in the last 168 hours.    Recent Labs   Lab 10/17/24  1718 10/18/24  0653 10/19/24  0710   ALT 15  --   --    AST 19  --   --    ALKPHO 80  --   --    BILT 0.3  --   --    HGB 9.0* 7.9* 8.4*   WBC 8.0 4.8 5.6       Imaging:  No results found.       Impression:   Severe reflux esophagitis  Possible Escobar's esophagus  Hiatal hernia  Bloody emesis (caused by above)  Chronic anemia    Recommendations:  Continue BID PPI  Will need to continue as outpatient indefinitely  Follow-up with NP in 2 weeks as outpatient in GI clinic  Plan for repeat EGD as well as colonoscopy as outpatient in 4-6 weeks  Needs to avoid laying back or reclining after eating  OK for discharge when cleared by other services  Will sign off at this time      Alex Dupree MD   10/20/2024         [1] No Known Allergies

## 2024-10-21 NOTE — DISCHARGE INSTRUCTIONS
Per Gastroenterology  Take pantoprazole 40 mg tablet twice daily, 30 min prior to breakfast and again prior to dinner  Follow-up in GI clinic with Violeta Last in 2 weeks.  Will need EGD and colonoscopy in 4-6 weeks

## 2024-10-21 NOTE — PLAN OF CARE
Pt alert/oriented, has developmental delay.  Lungs clear, denies shortness of breath, no cough noted.  Abdomen nondistended, soft/nontender, active bowel sounds.  Tolerating general diet, no nausea/vomiting.  No bowel movement today.  Up in room, ambulated with physical therapy.  Gait steady.  Dr. Fuentes here to see. Discharge planning.  Updated Tamanna MCKINNEY of plan of care.  Tamanna stated her concerns of him living on his own, states has his own apartment and history of falls and seizures.  Notified Dr. Fuentes of above, social work consulted for caregiver resources.   Vitals stable, afebrile on day shift.  Denies pain.  Needs attended.  Safety and seizure precautions in place.     Problem: GASTROINTESTINAL - ADULT  Goal: Minimal or absence of nausea and vomiting  Description: INTERVENTIONS:  - Maintain adequate hydration with IV or PO as ordered and tolerated  - Nasogastric tube to low intermittent suction as ordered  - Evaluate effectiveness of ordered antiemetic medications  - Provide nonpharmacologic comfort measures as appropriate  - Advance diet as tolerated, if ordered  - Obtain nutritional consult as needed  - Evaluate fluid balance  Outcome: Progressing     Problem: METABOLIC/FLUID AND ELECTROLYTES - ADULT  Goal: Electrolytes maintained within normal limits  Description: INTERVENTIONS:  - Monitor labs and rhythm and assess patient for signs and symptoms of electrolyte imbalances  - Administer electrolyte replacement as ordered  - Monitor response to electrolyte replacements, including rhythm and repeat lab results as appropriate  - Fluid restriction as ordered  - Instruct patient on fluid and nutrition restrictions as appropriate  Outcome: Progressing

## 2024-10-21 NOTE — PROGRESS NOTES
[facility]   part of [facility]     Hospitalist Progress Note     Yusuf Wilkesr Patient Status:  Inpatient    10/1/1955 MRN [id number]   Location [facility] 4NW-A Attending Caroline Parra,    Hosp Day # 4 PCP WEI THORPE     Chief Complaint: abd pain and dark stools  hiccups    Subjective:     Patient denies abd pain nausea vomiting    Objective:    Review of Systems:   A comprehensive review of systems was completed; pertinent positive and negatives stated in subjective.    Vital signs:  Temp:  [97 °F (36.1 °C)-100.6 °F (38.1 °C)] 97 °F (36.1 °C)  Pulse:  [71-99] 78  Resp:  [16-20] 20  BP: (104-133)/(55-68) 104/55  SpO2:  [93 %-99 %] 96 %    Physical Exam:    General: No acute distress  Respiratory: No wheezes, no rhonchi  Cardiovascular: S1, S2, regular rate and rhythm  Abdomen: Soft, Non-tender, non-distended, positive bowel sounds  Neuro: No new focal deficits.   Extremities: No edema      Diagnostic Data:    Labs:  Recent Labs   Lab 10/17/24  1718 10/18/24  0653 10/19/24  0710   WBC 8.0 4.8 5.6   HGB 9.0* 7.9* 8.4*   MCV 73.2* 75.7* 75.3*   .0 226.0 263.0       Recent Labs   Lab 10/17/24  1718 10/18/24  0653 10/19/24  0628   * 95  --    BUN 37* 22  --    CREATSERUM 0.93 0.74  --    CA 8.0* 7.8*  --    ALB 3.4  --   --     141  --    K 3.7 3.4* 3.8    109  --    CO2 26.0 26.0  --    ALKPHO 80  --   --    AST 19  --   --    ALT 15  --   --    BILT 0.3  --   --    TP 5.6*  --   --        Estimated Creatinine Clearance: 84.7 mL/min (based on SCr of 0.74 mg/dL).    No results for input(s): \"TROP\", \"TROPHS\", \"CK\" in the last 168 hours.    No results for input(s): \"PTP\", \"INR\" in the last 168 hours.               Microbiology    No results found for this visit on 10/17/24.      Imaging: Reviewed in Epic.    Medications:    pantoprazole  40 mg Oral BID AC    phenytoin ER  200 mg Oral Nightly    phenytoin ER  300 mg Oral Daily       Assessment & Plan:      #Abdominal  Pain/Melena, Microcytic Anemia  -PPI BID  -s/p egd  -Barretts esophagus, Hiatal Hernia     #Seizure Disorder  #Developmental Delay  -Phenytoin       Bianca Fuentes MD    Supplementary Documentation:     Quality:  DVT Mechanical Prophylaxis:     Early ambuation  DVT Pharmacologic Prophylaxis   Medication   None                Code Status: Not on file  Larry: No urinary catheter in place  Larry Duration (in days):   Central line:    ISHA:     Discharge is dependent on: progress  At this point Mr. Wetzel is expected to be discharge to: home    The 21st Century Cures Act makes medical notes like these available to patients in the interest of transparency. Please be advised this is a medical document. Medical documents are intended to carry relevant information, facts as evident, and the clinical opinion of the practitioner. The medical note is intended as peer to peer communication and may appear blunt or direct. It is written in medical language and may contain abbreviations or verbiage that are unfamiliar.              **Certification      PHYSICIAN Certification of Need for Inpatient Hospitalization - Initial Certification    Patient will require inpatient services that will reasonably be expected to span two midnight's based on the clinical documentation in H+P.   Based on patients current state of illness, I anticipate that, after discharge, patient will require TBD.

## 2024-10-21 NOTE — PLAN OF CARE
Received patient awake and alert x 3. Has developmental delay and he talks to himself. Hyperverbal.    O2 protocol: On room air, clear lungs, sats 97%. Denies shortness of breath.  Normal sinus rhythm on telemetry monitoring.   Cardiac electrolyte protocol, K 3.8.  Denies any abdominal pain or nausea.   SCD for VTE prophylaxis.  Had bowel movement today.  Voiding adequately.   Fall precaution, bed alarm on, call light and bedside table within reach.    Up with standby assist.    Tolerated regular diet, ate 100% dinner.   Discharge planning in am when stable.     Problem: Patient/Family Goals  Goal: Patient/Family Long Term Goal  Description: Patient's Long Term Goal: Stay healthy    Interventions:  - Follow up with PCP  - See additional Care Plan goals for specific interventions  Outcome: Progressing  Goal: Patient/Family Short Term Goal  Description: Patient's Short Term Goal: Have no further bleeding     Interventions:   - GI Consult  -PPI  - See additional Care Plan goals for specific interventions  Outcome: Progressing     Problem: GASTROINTESTINAL - ADULT  Goal: Minimal or absence of nausea and vomiting  Description: INTERVENTIONS:  - Maintain adequate hydration with IV or PO as ordered and tolerated  - Nasogastric tube to low intermittent suction as ordered  - Evaluate effectiveness of ordered antiemetic medications  - Provide nonpharmacologic comfort measures as appropriate  - Advance diet as tolerated, if ordered  - Obtain nutritional consult as needed  - Evaluate fluid balance  Outcome: Progressing  Goal: Maintains or returns to baseline bowel function  Description: INTERVENTIONS:  - Assess bowel function  - Maintain adequate hydration with IV or PO as ordered and tolerated  - Evaluate effectiveness of GI medications  - Encourage mobilization and activity  - Obtain nutritional consult as needed  - Establish a toileting routine/schedule  - Consider collaborating with pharmacy to review patient's medication  profile  Outcome: Progressing     Problem: METABOLIC/FLUID AND ELECTROLYTES - ADULT  Goal: Electrolytes maintained within normal limits  Description: INTERVENTIONS:  - Monitor labs and rhythm and assess patient for signs and symptoms of electrolyte imbalances  - Administer electrolyte replacement as ordered  - Monitor response to electrolyte replacements, including rhythm and repeat lab results as appropriate  Outcome: Progressing

## 2024-10-22 VITALS
TEMPERATURE: 98 F | DIASTOLIC BLOOD PRESSURE: 64 MMHG | SYSTOLIC BLOOD PRESSURE: 107 MMHG | WEIGHT: 142.31 LBS | RESPIRATION RATE: 16 BRPM | HEART RATE: 83 BPM | HEIGHT: 67 IN | BODY MASS INDEX: 22.34 KG/M2 | OXYGEN SATURATION: 100 %

## 2024-10-22 NOTE — CM/SW NOTE
10/22/24 1000   CM/SW Referral Data   Referral Source Social Work (self-referral)   Reason for Referral Discharge planning   Informant HCPOA/Surrogate;Clinical Staff Member   Patient Info   Patient's Home Environment House   Patient lives with Alone   Discharge Needs   Anticipated D/C needs Caregiver services     SW noted consult order for caregiver resources. SW met with patient's POA's at bedside to discuss. They confirmed that patient lives alone and has all of the DME that he needs. They requested more hours for caregiving support. Patient is currently getting paid caregiving hours from the State and his POA Kayla who is a friend is providing the hours and is paid by the state.     SW informed patient's two POA's Reji and Kayla that they will need to work with the state care manager and patient's PCP in order to have additional hours approved. This is not something that can handled by hospital due to hospitalists not following patient's after DC. They both expressed understanding and will call patient's state assigned .     SW will continue to follow for plan of care changes and remain available for any additional DC needs or concerns.     Mariela Mackay MSW, LSW  Discharge Planner   [id number]

## 2024-10-22 NOTE — PROGRESS NOTES
Patient A&Ox4, responds appropriately. VSS on RA, . Denies abd pain or  discomfort. Tolerating diet, voiding freely in bathroom. Side rails padded. Plan to DC home today. POA updated.

## 2024-10-22 NOTE — PROGRESS NOTES
NURSING DISCHARGE NOTE    Discharged Home via Wheelchair.  Accompanied by Support staff  Belongings Taken by patient/family.    Discharge instructions including medications and follow-up appointments reviewed with POA. PIV removed. All questions answered at this time. POA demonstrates understanding.

## 2024-10-22 NOTE — PLAN OF CARE
Received pt alert and oriented x4. VSS on RA. Afebrile. All medications given per MAR. Fall and safety precautions in place. Call light within reach.     Problem: GASTROINTESTINAL - ADULT  Goal: Minimal or absence of nausea and vomiting  Description: INTERVENTIONS:  - Maintain adequate hydration with IV or PO as ordered and tolerated  - Nasogastric tube to low intermittent suction as ordered  - Evaluate effectiveness of ordered antiemetic medications  - Provide nonpharmacologic comfort measures as appropriate  - Advance diet as tolerated, if ordered  - Obtain nutritional consult as needed  - Evaluate fluid balance  Outcome: Progressing

## 2024-10-27 NOTE — DISCHARGE SUMMARY
[facility]     Yusuf Wetzel Patient Status:  Inpatient    10/1/1955 MRN [id number]   Location [facility] 4NW-A Attending No att. providers found   Hosp Day # 5 PCP WEI THORPE     Date of Admission: 10/17/2024  Date of Discharge:  10/22/2024     Discharge Disposition: Home or Self Care    Discharge Diagnosis:  Anemia  GI bleeding  History of seizures  Developmental delay    History of Present Illness: States 69 years old man presents with abdominal pain and hiccups nausea vomiting diarrhea and coffee-ground emesis.    Brief Synopsis: Patient was started on PPI twice daily kept n.p.o. and GI was placed on consult.  Stool studies for C. difficile and GI PCR panel were sent as well as iron studies.  Patient has no family and power of  is guardian of the state.  Endoscopy showed esophagus grossly abnormal mucosal ulceration with contact bleeding and exudate.  Recommendation was for PPI twice daily and repeat EGD in 4 to 6 weeks.  Hiccups resolved as well.  I discussed in detail with power of .    Lace+ Score: 43  59-90 High Risk  29-58 Medium Risk  0-28   Low Risk       TCM Follow-Up Recommendation:  LACE 29-58: Moderate Risk of readmission after discharge from the hospital.      Procedures during hospitalization:   EGD    Incidental or significant findings and recommendations (brief descriptions):  Esophageal mucosal ulcerations    Lab/Test results pending at Discharge:   CBC to be rechecked by PCP and GI    Consultants:  GI    Discharge Medication List:     Discharge Medications        START taking these medications        Instructions Prescription details   pantoprazole 40 MG Tbec  Commonly known as: Protonix      Take 1 tablet (40 mg total) by mouth 2 (two) times daily before meals.   Quantity: 180 tablet  Refills: 3            CONTINUE taking these medications        Instructions Prescription details   phenytoin  MG Caps  Commonly known as: Dilantin       Take 2 capsules (200 mg total) by mouth at bedtime. Take 200mg (2 capsules) by mouth every evening   Refills: 0     phenytoin  MG Caps  Commonly known as: Dilantin      Take 3 capsules (300 mg total) by mouth every morning. Take 300mg (3 capsules) by mouth every morning.   Refills: 0               Where to Get Your Medications        These medications were sent to [facility] [address] [place] [address] [phone], [phone]  [address] [place] [id number]      Phone: [phone]   pantoprazole 40 MG [facility] reviewed:     Follow-up appointment:   Violeta Price, APRN  [address]  [facility] [address] [phone]    Follow up in 2 week(s)  hospital follow-up    Appointments for Next 30 Days 10/27/2024 - 2024      None            Vital signs:       Physical Exam:    General: No acute distress   Lungs: clear to auscultation  Cardiovascular: S1, S2  Abdomen: Soft      -----------------------------------------------------------------------------------------------  PATIENT DISCHARGE INSTRUCTIONS: See electronic chart    Bianca Fuentes MD    Total time spent on discharge plannin minutes     The  Cent[employer] Cures Act makes medical notes like these available to patients in the interest of transparency. Please be advised this is a medical document. Medical documents are intended to carry relevant information, facts as evident, and the clinical opinion of the practitioner. The medical note is intended as peer to peer communication and may appear blunt or direct. It is written in medical language and may contain abbreviations or verbiage that are unfamiliar.

## 2024-12-28 ENCOUNTER — APPOINTMENT (OUTPATIENT)
Dept: CT IMAGING | Facility: HOSPITAL | Age: 69
End: 2024-12-28
Attending: EMERGENCY MEDICINE
Payer: MEDICARE

## 2024-12-28 ENCOUNTER — HOSPITAL ENCOUNTER (EMERGENCY)
Facility: HOSPITAL | Age: 69
Discharge: HOME | End: 2024-12-28
Attending: EMERGENCY MEDICINE
Payer: MEDICARE

## 2024-12-28 ENCOUNTER — APPOINTMENT (OUTPATIENT)
Dept: GENERAL RADIOLOGY | Facility: HOSPITAL | Age: 69
End: 2024-12-28
Payer: MEDICARE

## 2024-12-28 VITALS
TEMPERATURE: 98 F | HEART RATE: 94 BPM | OXYGEN SATURATION: 99 % | WEIGHT: 140 LBS | HEIGHT: 66 IN | SYSTOLIC BLOOD PRESSURE: 128 MMHG | RESPIRATION RATE: 18 BRPM | BODY MASS INDEX: 22.5 KG/M2 | DIASTOLIC BLOOD PRESSURE: 78 MMHG

## 2024-12-28 DIAGNOSIS — R78.89 SUBTHERAPEUTIC SERUM DILANTIN LEVEL: ICD-10-CM

## 2024-12-28 DIAGNOSIS — G40.909 SEIZURE DISORDER (HCC): Primary | ICD-10-CM

## 2024-12-28 LAB
ALBUMIN SERPL-MCNC: 3.7 G/DL (ref 3.2–4.8)
ALBUMIN/GLOB SERPL: 1.4 {RATIO} (ref 1–2)
ALP LIVER SERPL-CCNC: 95 U/L
ALT SERPL-CCNC: 22 U/L
ANION GAP SERPL CALC-SCNC: 4 MMOL/L (ref 0–18)
AST SERPL-CCNC: 14 U/L (ref ?–34)
BASOPHILS # BLD AUTO: 0.04 X10(3) UL (ref 0–0.2)
BASOPHILS NFR BLD AUTO: 0.5 %
BILIRUB SERPL-MCNC: 0.2 MG/DL (ref 0.2–1.1)
BUN BLD-MCNC: 29 MG/DL (ref 9–23)
CALCIUM BLD-MCNC: 8.5 MG/DL (ref 8.7–10.4)
CHLORIDE SERPL-SCNC: 106 MMOL/L (ref 98–112)
CO2 SERPL-SCNC: 27 MMOL/L (ref 21–32)
CREAT BLD-MCNC: 1.06 MG/DL
EGFRCR SERPLBLD CKD-EPI 2021: 75 ML/MIN/1.73M2 (ref 60–?)
EOSINOPHIL # BLD AUTO: 0.13 X10(3) UL (ref 0–0.7)
EOSINOPHIL NFR BLD AUTO: 1.5 %
ERYTHROCYTE [DISTWIDTH] IN BLOOD BY AUTOMATED COUNT: 17.3 %
GLOBULIN PLAS-MCNC: 2.7 G/DL (ref 2–3.5)
GLUCOSE BLD-MCNC: 127 MG/DL (ref 70–99)
HCT VFR BLD AUTO: 34.5 %
HGB BLD-MCNC: 11.1 G/DL
IMM GRANULOCYTES # BLD AUTO: 0.04 X10(3) UL (ref 0–1)
IMM GRANULOCYTES NFR BLD: 0.5 %
LYMPHOCYTES # BLD AUTO: 1.19 X10(3) UL (ref 1–4)
LYMPHOCYTES NFR BLD AUTO: 13.7 %
MCH RBC QN AUTO: 25.7 PG (ref 26–34)
MCHC RBC AUTO-ENTMCNC: 32.2 G/DL (ref 31–37)
MCV RBC AUTO: 79.9 FL
MONOCYTES # BLD AUTO: 1.09 X10(3) UL (ref 0.1–1)
MONOCYTES NFR BLD AUTO: 12.5 %
NEUTROPHILS # BLD AUTO: 6.21 X10 (3) UL (ref 1.5–7.7)
NEUTROPHILS # BLD AUTO: 6.21 X10(3) UL (ref 1.5–7.7)
NEUTROPHILS NFR BLD AUTO: 71.3 %
OSMOLALITY SERPL CALC.SUM OF ELEC: 291 MOSM/KG (ref 275–295)
PHENYTOIN SERPL-MCNC: 3.1 UG/ML (ref 10–20)
PLATELET # BLD AUTO: 280 10(3)UL (ref 150–450)
POTASSIUM SERPL-SCNC: 4.1 MMOL/L (ref 3.5–5.1)
PROT SERPL-MCNC: 6.4 G/DL (ref 5.7–8.2)
RBC # BLD AUTO: 4.32 X10(6)UL
SODIUM SERPL-SCNC: 137 MMOL/L (ref 136–145)
WBC # BLD AUTO: 8.7 X10(3) UL (ref 4–11)

## 2024-12-28 PROCEDURE — 85025 COMPLETE CBC W/AUTO DIFF WBC: CPT | Performed by: EMERGENCY MEDICINE

## 2024-12-28 PROCEDURE — 80185 ASSAY OF PHENYTOIN TOTAL: CPT | Performed by: EMERGENCY MEDICINE

## 2024-12-28 PROCEDURE — 80053 COMPREHEN METABOLIC PANEL: CPT | Performed by: EMERGENCY MEDICINE

## 2024-12-28 PROCEDURE — 73130 X-RAY EXAM OF HAND: CPT

## 2024-12-28 PROCEDURE — 99284 EMERGENCY DEPT VISIT MOD MDM: CPT

## 2024-12-28 PROCEDURE — 70450 CT HEAD/BRAIN W/O DYE: CPT | Performed by: EMERGENCY MEDICINE

## 2024-12-28 PROCEDURE — 99285 EMERGENCY DEPT VISIT HI MDM: CPT

## 2024-12-28 PROCEDURE — 96374 THER/PROPH/DIAG INJ IV PUSH: CPT

## 2024-12-28 RX ORDER — LEVETIRACETAM 500 MG/5ML
1000 INJECTION, SOLUTION, CONCENTRATE INTRAVENOUS ONCE
Status: COMPLETED | OUTPATIENT
Start: 2024-12-28 | End: 2024-12-28

## 2024-12-29 NOTE — ED INITIAL ASSESSMENT (HPI)
Per neighbor patient has scab to R eye, R wrist swelling after possible seizure. Patient does not remember what happened or when it happened. No thinners.

## 2024-12-30 ENCOUNTER — TELEPHONE (OUTPATIENT)
Dept: NEUROLOGY | Facility: CLINIC | Age: 69
End: 2024-12-30

## 2024-12-30 NOTE — TELEPHONE ENCOUNTER
SEIZURE CLINIC SCREENING    1.         Date of ED discharge:  12/28/2024    2.         Is patient currently admitted?  No              (if YES - Seizure Clinic Appointment not required.  Patient should follow usual non-urgent scheduling procedures to see neurology upon discharge from hospital.)    2.         Does the patient already have a non-TRAN neurologist (seen in past 3 years, check care everywhere and ask the patient)?  Yes  Name:  Dr. Ghassan Siddiqui              (if YES - Seizure Clinic Appointment not required.  Patient should be advised to contact their neurologist.)

## 2024-12-31 NOTE — ED PROVIDER NOTES
Patient Seen in: [facility] Emergency Department      History     Chief Complaint   Patient presents with    Trauma     Stated Complaint: head and right hand injury S/P possible seizure per neighbor    Subjective:   HPI      69-year-old male, reports experiencing a seizure approximately two days ago. He is currently taking Dilantin, with a regimen of one dose in the morning, one at noon, and one in the evening. He indicates that prior to this recent seizure, it had not been a long time since his last episode. He mentions a head injury, which he attributes to picking things up. There is no mention of any other symptoms or concerns.  Patient states his neighbor had called the ambulance after having a seizure.  However he states he feels fine now.    Objective:     Past Medical History:    Epilepsy (HCC)    Stroke (HCC)              History reviewed. No pertinent surgical history.             Social History     Socioeconomic History    Marital status: Single   Tobacco Use    Smoking status: Never    Smokeless tobacco: Never   Vaping Use    Vaping status: Never Used   Substance and Sexual Activity    Alcohol use: Not Currently    Drug use: Not Currently     Social Drivers of Health     Food Insecurity: No Food Insecurity (10/17/2024)    Food Insecurity     Food Insecurity: Never true   Transportation Needs: No Transportation Needs (10/17/2024)    Transportation Needs     Lack of Transportation: No    Received from [facility]    Family and Community Support   Housing Stability: Low Risk  (10/17/2024)    Housing Stability     Housing Instability: No                  Physical Exam     ED Triage Vitals [12/28/24 2042]   /76   Pulse 97   Resp 18   Temp 98.4 °F (36.9 °C)   Temp src Temporal   SpO2 97 %   O2 Device None (Room air)       Current Vitals:   No data recorded    Physical Exam    Vital signs reviewed  General appearance: Patient is alert and in no acute distress  HEENT: Pupils equal react  to light extraocular muscles intact no scleral icterus, mucous membranes are moist, there is no erythema or exudate in the posterior pharynx ecchymosis and old contusion with an old laceration over the right eye.  Neck: Supple no JVD no lymphadenopathy no meningismus no carotid bruit  CV: Regular rate and rhythm no murmur rub  Respiratory: Clear to auscultation bilaterally no crackles no wheezes no accessory muscle use  Abdomen: Soft nontender nondistended, no rebound no guarding  no hepatosplenomegaly bowel sounds are present , no pulsatile mass  Extremities: No clubbing cyanosis or edema 2+ distal pulses.  Right hand is swollen and tender but he states it has been that way for a long time hand x-ray due to the swelling  Neuro: Cranial nerves II through XII intact with no gross focal sensory or motor abnormality.      ED Course     Labs Reviewed   COMP METABOLIC PANEL (14) - Abnormal; Notable for the following components:       Result Value    Glucose 127 (*)     BUN 29 (*)     Calcium, Total 8.5 (*)     All other components within normal limits   CBC WITH DIFFERENTIAL WITH PLATELET - Abnormal; Notable for the following components:    HGB 11.1 (*)     HCT 34.5 (*)     MCV 79.9 (*)     MCH 25.7 (*)     Monocyte Absolute 1.09 (*)     All other components within normal limits   PHENYTOIN (DILANTIN) TOTAL - Abnormal; Notable for the following components:    Phenytoin (Dilantin) 3.1 (*)     All other components within normal limits   RAINBOW DRAW LAVENDER   RAINBOW DRAW LIGHT GREEN   RAINBOW DRAW BLUE            Patient was evaluated had a CBC chemistry and a Dilantin level drawn.  Also will get a CT and    Medications   levETIRAcetam (Keppra) 500 mg/5mL injection 1,000 mg (1,000 mg Intravenous Given 12/28/24 221)         CT BRAIN OR HEAD (CPT=70450)    Result Date: 12/28/2024  CONCLUSION:  No acute intracranial abnormality.  Mild right periorbital soft tissue swelling is noted    LOCATION:  [facility]   Dictated by (CST):  Beto Grissom MD on 12/28/2024 at 10:49 PM     Finalized by (CST): Beto Grissom MD on 12/28/2024 at 11:02 PM       XR HAND (MIN 3 VIEWS), RIGHT (CPT=73130)    Result Date: 12/28/2024  CONCLUSION:  No acute fracture or dislocation.  LOCATION:  [facility]   Dictated by (CST): Beto Grissom MD on 12/28/2024 at 9:38 PM     Finalized by (CST): Beto Grissom MD on 12/28/2024 at 9:39 PM          CT scan showed nothing acute except some right periorbital soft tissue swelling.  The laceration is old so cannot repair it.  I also think he may have had a seizure a few days ago and maybe not telling me the truth and fell and hit his head.  His Dilantin level was extremely low.  I did offer him admission but he states he would like to go home I loaded him with Keppra and he states he will call his neurologist in the morning but he needs to make sure he is taking his medication as I do not feel he has.  I made sure he had some at home and he states he does.  Told him if he has another seizure he will need to be admitted he states he will make sure he takes his meds and get his levels up he also states he will call his neurologist in the morning     Patient was seen immediately upon arrival due to a high probability of sudden, clinically significant, or life threatening deterioration of the patient's clinical status.  The patient required my time at the bedside performing direct personal management, the absence of which would likely result in sudden, clinically significant or life threatening deterioration of  clinical condition.   The patient required my constant attention. Time was spent ordering, reviewing, and interpreting ancillary testing and imaging. The patient was frequently reevaluated, and I made frequent checks of  vital signs and monitor. Nursing notes were reviewed.     Critical care time was[60 minutes], and exclusive of procedures.  MDM      Differential diagnosis reflecting the complexity of care include: Seizure  disorder, noncompliance of medication, subtherapeutic Dilantin level    Comorbidities that add complexity to management include: Epilepsy      My independent interpretation of studies of: CT brain shows some soft tissue swelling in the right periorbital area.  No stroke or mass effect.  X-ray of hand shows no acute fracture    Social determinants of health that affect care: Try to explain the patient's very important that he takes his Dilantin as instructed    Shared decision making was done by myself and patient.  He will make sure he is taking his Dilantin and call neurology in the morning.  I did offer him admission but he wants to go home                MDM    Disposition and Plan     Clinical Impression:  1. Seizure disorder (HCC)    2. Subtherapeutic serum dilantin level         Disposition:  Discharge  12/28/2024 11:10 pm    Follow-up:  [address] [place] [address] [place] [id number] [phone]  Call   An EEG and MRI have been ordered. Call office and choose option 1 for general neurology and state that you are following up for Seizure          Medications Prescribed:  Discharge Medication List as of 12/28/2024 11:16 PM              Supplementary Documentation: Patient was screened and evaluated during this visit.  As the treating physician attending to the patient, I determined within reasonable clinical confidence and prior to discharge, that an emergency medical condition was not or was no longer present.  There was no indication for further evaluation, treatment, or admission on an emergency basis.  Comprehensive verbal and written discharge and follow-up instructions were provided to help prevent relapse or worsening.  Patient was instructed to follow-up with primary care provider for further evaluation treatment, return immediately to ER for worsening, concerning, new, or changing/persisting symptoms.  I discussed the case with the  patient and they had no questions, complaints, or concerns.  Patient was comfortable going home.      Dictation Disclaimer Note:   To increase efficiency this document may have been prepared using voice recognition technology. Every effort has been made to correct any errors made during preparation of this note. However, if a word or phrase is confusing, or does not make sense, this is likely due to a recognition error within the program which was not discovered during editing. Please do not hesitate to contact to address any significant errors.    Note to Patient:   The 21st Century Cures Act makes medical notes like these available to patients in the interest of transparency. Please be advised this is a medical document. Medical documents are intended to carry relevant information, facts as evident, and the clinical opinion of the practitioner. The medical note is intended as peer to peer communication and may appear blunt or direct. It is written in medical language and may contain abbreviations or verbiage that are unfamiliar.

## 2025-01-29 NOTE — CONSULTS
[facility]  GASTROENTEROLOGY NEW CONSULT NOTE   [facility]     Yusuf Wetzel Patient Status:  Inpatient    10/1/1955 MRN [id number]   Location [facility] 4NW-A Attending Caroline Parra,    Hosp Day # 1 PCP WEI THORPE       Reason for Consultation:   Nausea, emesis, diarrhea, melena    History of Present Illness (HPI):  Yusuf Wetzel is a 69 year old male with chronic medical conditions including developmental delay, epilepsy, and stroke that presented to the ER yesterday with mid abdominal pain and hiccups.  He also noted some nausea, vomiting, and diarrhea.  The emesis was noted to be dark.  History obtained primarily through chart review and his power of , Tamanna, as he is unable to provide an accurate history.  Per Tamanna, he does not take any NSAIDs.  He does take Pepto-Bismol and took 2 doses prior to admission.  He has not had any prior endoscopic evaluation with EGD or colonoscopy.  Denies blood thinner use.  Hemoglobin on admission was noted to be 9 which decreased to 7.9 this morning.  Iron studies showed ferritin of 6, transferrin saturation of 5% and iron of 13.    Past Medical History:  Past Medical History:    Epilepsy (HCC)    Stroke (HCC)       Past Surgical History:  History reviewed. No pertinent surgical history.    Family History:  No first-degree relative with a history of colorectal cancer.    Social History:  No IVDU      Medications:    potassium chloride 40 mEq in 250mL sodium chloride 0.9% IVPB premix  40 mEq Intravenous Once    [COMPLETED] sodium chloride 0.9 % IV bolus 1,000 mL  1,000 mL Intravenous Once    [COMPLETED] ondansetron (Zofran) 4 MG/2ML injection 4 mg  4 mg Intravenous Once    [COMPLETED] iopamidol 76% (ISOVUE-370) injection for power injector  80 mL Intravenous ONCE PRN    [COMPLETED] pantoprazole (Protonix) 40 mg in sodium chloride 0.9% PF 10 mL IV push  40 mg Intravenous Once    [] sodium chloride 0.9% infusion   Intravenous  Continuous    pantoprazole (Protonix) 40 mg in sodium chloride 0.9% PF 10 mL IV push  40 mg Intravenous Q12H    phenytoin ER (Dilantin) cap 200 mg  200 mg Oral Nightly    phenytoin ER (Dilantin) cap 300 mg  300 mg Oral Daily    acetaminophen (Tylenol Extra Strength) tab 1,000 mg  1,000 mg Oral Q8H PRN    melatonin tab 3 mg  3 mg Oral Nightly PRN    lactated ringers infusion   Intravenous Continuous    ondansetron (Zofran) 4 MG/2ML injection 4 mg  4 mg Intravenous Q6H PRN    metoclopramide (Reglan) 5 mg/mL injection 10 mg  10 mg Intravenous Q8H PRN    polyethylene glycol (PEG 3350) (Miralax) 17 g oral packet 17 g  17 g Oral Daily PRN    sennosides (Senokot) tab 17.2 mg  17.2 mg Oral Nightly PRN    bisacodyl (Dulcolax) 10 MG rectal suppository 10 mg  10 mg Rectal Daily PRN    fleet enema (Fleet) rectal enema 133 mL  1 enema Rectal Once PRN    benzonatate (Tessalon) cap 200 mg  200 mg Oral TID PRN    guaiFENesin (Robitussin) 100 MG/5 ML oral liquid 200 mg  200 mg Oral Q4H PRN    glycerin-hypromellose- (Artificial Tears) 0.2-0.2-1 % ophthalmic solution 1 drop  1 drop Both Eyes QID PRN    sodium chloride (Saline Mist) 0.65 % nasal solution 1 spray  1 spray Each Nare Q3H PRN       Allergies:  Allergies[1]    Review of Systems  Negative unless otherwise mentioned in HPI.     Physical Exam  /63 (BP Location: Right arm)   Pulse 77   Temp 98 °F (36.7 °C) (Oral)   Resp 16   Ht 5' 7\" (1.702 m)   Wt 142 lb 4.8 oz (64.5 kg)   SpO2 99%   BMI 22.29 kg/m²   Body mass index is 22.29 kg/m².      Gen: Awake and alert, NAD  Eyes:  no scleral icterus  Cardiovascular: Warm, well-perfused  Respiratory: No respiratory distress  Abd: Soft, non-tender, non-distended. No rebound tenderness, no guarding.  Neuro:  Alert    Diagnostic Data:      Labs:  Recent Labs   Lab 10/17/24  1718 10/18/24  0653   WBC 8.0 4.8   HGB 9.0* 7.9*   MCV 73.2* 75.7*   .0 226.0       Recent Labs   Lab 10/17/24  1718 10/18/24  0653   GLU  114* 95   BUN 37* 22   CREATSERUM 0.93 0.74   CA 8.0* 7.8*   ALB 3.4  --     141   K 3.7 3.4*    109   CO2 26.0 26.0   ALKPHO 80  --    AST 19  --    ALT 15  --    BILT 0.3  --    TP 5.6*  --        No results for input(s): \"PTP\", \"INR\" in the last 168 hours.           Imaging: Imaging data reviewed in Epic.    Medications:    potassium chloride  40 mEq Intravenous Once    pantoprazole  40 mg Intravenous Q12H    phenytoin ER  200 mg Oral Nightly    phenytoin ER  300 mg Oral Daily       Allergies:  Allergies[2]    Imaging:  I have personally reviewed all pertinent available imaging.       ASSESSMENT / PLAN:     Yusuf Wetzel is a 69 year old male with chronic medical conditions including developmental delay, epilepsy, and stroke with GI being consulted today for nausea, dark emesis, melena, and diarrhea.  He is noted to be anemic with iron deficiency with ferritin of 6.  This may be secondary to upper GI bleed or possibly dark stools from Pepto-Bismol.  CT on admission concerning for esophagitis, gastritis/gastric lesion, and mild colitis will need bidirectional endoscopic evaluation.      Recommendations:   CLD, NPO after midnight  Stool studies with C. difficile and GI PCR panel if he has any further diarrhea  Plan for endoscopic evaluation with EGD and colonoscopy tomorrow given iron deficiency anemia, dark stools, esophagitis, gastric lesion, and possible colitis noted on his CT  Discussed with patient's power of , Tamanna, who is in agreement with endoscopic evaluation.  Continue to monitor hemoglobin and transfuse for hemoglobin less than 7.  Monitor for signs of overt bleeding.  Pantoprazole 40 mg IV twice daily  CBC and CMP in the morning    Thank you for the consult and allowing us to participate in patient's care.  GI will follow. Please page with any questions or concerns.     Jeffrey Mobley DO  10/18/2024  [facility]             [1] No Known Allergies  [2] No Known  Allergies     ALEJANDRA Salazar OR KATIE Canseco

## 2025-06-25 ENCOUNTER — TELEPHONE (OUTPATIENT)
Dept: NEUROLOGY | Facility: CLINIC | Age: 70
End: 2025-06-25

## 2025-07-07 ENCOUNTER — TELEPHONE (OUTPATIENT)
Dept: INFUSION THERAPY | Age: 70
End: 2025-07-07

## 2025-07-18 ENCOUNTER — LAB SERVICES (OUTPATIENT)
Dept: LAB | Age: 70
End: 2025-07-18
Attending: STUDENT IN AN ORGANIZED HEALTH CARE EDUCATION/TRAINING PROGRAM

## 2025-07-18 ENCOUNTER — OFFICE VISIT (OUTPATIENT)
Dept: HEMATOLOGY/ONCOLOGY | Age: 70
End: 2025-07-18
Attending: STUDENT IN AN ORGANIZED HEALTH CARE EDUCATION/TRAINING PROGRAM

## 2025-07-18 ENCOUNTER — TELEPHONE (OUTPATIENT)
Dept: HEMATOLOGY/ONCOLOGY | Age: 70
End: 2025-07-18

## 2025-07-18 VITALS
HEART RATE: 73 BPM | SYSTOLIC BLOOD PRESSURE: 116 MMHG | TEMPERATURE: 97.2 F | WEIGHT: 144.9 LBS | BODY MASS INDEX: 22.74 KG/M2 | RESPIRATION RATE: 16 BRPM | HEIGHT: 67 IN | DIASTOLIC BLOOD PRESSURE: 74 MMHG | OXYGEN SATURATION: 99 %

## 2025-07-18 DIAGNOSIS — D50.0 IRON DEFICIENCY ANEMIA DUE TO CHRONIC BLOOD LOSS: Primary | ICD-10-CM

## 2025-07-18 DIAGNOSIS — D50.0 IRON DEFICIENCY ANEMIA DUE TO CHRONIC BLOOD LOSS: ICD-10-CM

## 2025-07-18 PROBLEM — K21.9 GERD (GASTROESOPHAGEAL REFLUX DISEASE): Status: ACTIVE | Noted: 2025-07-18

## 2025-07-18 PROBLEM — G31.84 MILD COGNITIVE IMPAIRMENT: Status: ACTIVE | Noted: 2025-07-18

## 2025-07-18 PROBLEM — G40.909 SEIZURE DISORDER  (CMD): Status: ACTIVE | Noted: 2025-07-18

## 2025-07-18 LAB
BASOPHILS # BLD: 0 K/MCL (ref 0–0.3)
BASOPHILS NFR BLD: 1 %
DEPRECATED RDW RBC: 45.1 FL (ref 39–50)
EOSINOPHIL # BLD: 0.2 K/MCL (ref 0–0.5)
EOSINOPHIL NFR BLD: 3 %
ERYTHROCYTE [DISTWIDTH] IN BLOOD: 18.7 % (ref 11–15)
FERRITIN SERPL-MCNC: 6 NG/ML (ref 26–388)
HCT VFR BLD CALC: 28.9 % (ref 39–51)
HGB BLD-MCNC: 8.2 G/DL (ref 13–17)
IMM GRANULOCYTES # BLD AUTO: 0 K/MCL (ref 0–0.2)
IMM GRANULOCYTES # BLD: 0 %
IRON SATN MFR SERPL: 3 % (ref 15–45)
IRON SERPL-MCNC: 12 MCG/DL (ref 65–175)
LYMPHOCYTES # BLD: 1.4 K/MCL (ref 1–4)
LYMPHOCYTES NFR BLD: 21 %
MCH RBC QN AUTO: 19.4 PG (ref 26–34)
MCHC RBC AUTO-ENTMCNC: 28.4 G/DL (ref 32–36.5)
MCV RBC AUTO: 68.3 FL (ref 78–100)
MONOCYTES # BLD: 0.5 K/MCL (ref 0.3–0.9)
MONOCYTES NFR BLD: 7 %
NEUTROPHILS # BLD: 4.3 K/MCL (ref 1.8–7.7)
NEUTROPHILS NFR BLD: 68 %
NRBC BLD MANUAL-RTO: 0 /100 WBC
PLATELET # BLD AUTO: 444 K/MCL (ref 140–450)
RBC # BLD: 4.23 MIL/MCL (ref 4.5–5.9)
TIBC SERPL-MCNC: 407 MCG/DL (ref 250–450)
WBC # BLD: 6.4 K/MCL (ref 4.2–11)

## 2025-07-18 PROCEDURE — 85025 COMPLETE CBC W/AUTO DIFF WBC: CPT

## 2025-07-18 PROCEDURE — 99202 OFFICE O/P NEW SF 15 MIN: CPT

## 2025-07-18 PROCEDURE — 82728 ASSAY OF FERRITIN: CPT

## 2025-07-18 PROCEDURE — 83540 ASSAY OF IRON: CPT

## 2025-07-18 PROCEDURE — 99203 OFFICE O/P NEW LOW 30 MIN: CPT | Performed by: STUDENT IN AN ORGANIZED HEALTH CARE EDUCATION/TRAINING PROGRAM

## 2025-07-18 PROCEDURE — 36415 COLL VENOUS BLD VENIPUNCTURE: CPT

## 2025-07-18 RX ORDER — DIPHENHYDRAMINE HYDROCHLORIDE 50 MG/ML
50 INJECTION, SOLUTION INTRAMUSCULAR; INTRAVENOUS
Start: 2025-07-18

## 2025-07-18 RX ORDER — DIPHENHYDRAMINE HYDROCHLORIDE 50 MG/ML
50 INJECTION, SOLUTION INTRAMUSCULAR; INTRAVENOUS
Start: 2025-07-25

## 2025-07-18 RX ORDER — ALBUTEROL SULFATE 90 UG/1
2 INHALANT RESPIRATORY (INHALATION)
OUTPATIENT
Start: 2025-07-18

## 2025-07-18 RX ORDER — ALBUTEROL SULFATE 0.83 MG/ML
5 SOLUTION RESPIRATORY (INHALATION)
OUTPATIENT
Start: 2025-07-25

## 2025-07-18 RX ORDER — FAMOTIDINE 10 MG/ML
20 INJECTION, SOLUTION INTRAVENOUS
OUTPATIENT
Start: 2025-07-25

## 2025-07-18 RX ORDER — RISPERIDONE 1 MG/1
TABLET ORAL
COMMUNITY
Start: 2025-06-23

## 2025-07-18 RX ORDER — FAMOTIDINE 10 MG/ML
20 INJECTION, SOLUTION INTRAVENOUS
OUTPATIENT
Start: 2025-07-18

## 2025-07-18 RX ORDER — ALBUTEROL SULFATE 0.83 MG/ML
5 SOLUTION RESPIRATORY (INHALATION)
OUTPATIENT
Start: 2025-07-18

## 2025-07-18 RX ORDER — PHENYTOIN SODIUM 100 MG/1
100 CAPSULE, EXTENDED RELEASE ORAL 3 TIMES DAILY
COMMUNITY

## 2025-07-18 RX ORDER — ALBUTEROL SULFATE 90 UG/1
2 INHALANT RESPIRATORY (INHALATION)
OUTPATIENT
Start: 2025-07-25

## 2025-07-18 ASSESSMENT — PAIN SCALES - GENERAL: PAINLEVEL_OUTOF10: 0

## 2025-07-22 ENCOUNTER — TELEPHONE (OUTPATIENT)
Dept: INFUSION THERAPY | Age: 70
End: 2025-07-22

## 2025-07-31 ENCOUNTER — TELEPHONE (OUTPATIENT)
Dept: INFUSION THERAPY | Age: 70
End: 2025-07-31

## 2025-08-04 ENCOUNTER — TELEPHONE (OUTPATIENT)
Dept: INFUSION THERAPY | Age: 70
End: 2025-08-04

## 2025-08-11 ENCOUNTER — APPOINTMENT (OUTPATIENT)
Dept: INFUSION THERAPY | Age: 70
End: 2025-08-11
Attending: STUDENT IN AN ORGANIZED HEALTH CARE EDUCATION/TRAINING PROGRAM

## 2025-08-13 ENCOUNTER — HOSPITAL ENCOUNTER (INPATIENT)
Age: 70
LOS: 2 days | Discharge: HOME HEALTH | DRG: 101 | End: 2025-08-16
Attending: STUDENT IN AN ORGANIZED HEALTH CARE EDUCATION/TRAINING PROGRAM | Admitting: INTERNAL MEDICINE

## 2025-08-13 ENCOUNTER — APPOINTMENT (OUTPATIENT)
Dept: CT IMAGING | Age: 70
DRG: 101 | End: 2025-08-13
Attending: STUDENT IN AN ORGANIZED HEALTH CARE EDUCATION/TRAINING PROGRAM

## 2025-08-13 ENCOUNTER — HOSPITAL ENCOUNTER (OUTPATIENT)
Dept: INFUSION THERAPY | Age: 70
Discharge: STILL ADMITTED OR ADMINISTRATIVE | End: 2025-08-13
Attending: STUDENT IN AN ORGANIZED HEALTH CARE EDUCATION/TRAINING PROGRAM

## 2025-08-13 VITALS
HEART RATE: 101 BPM | OXYGEN SATURATION: 98 % | SYSTOLIC BLOOD PRESSURE: 113 MMHG | BODY MASS INDEX: 23.65 KG/M2 | TEMPERATURE: 98.1 F | RESPIRATION RATE: 20 BRPM | DIASTOLIC BLOOD PRESSURE: 70 MMHG | WEIGHT: 151.01 LBS

## 2025-08-13 DIAGNOSIS — G40.919 BREAKTHROUGH SEIZURE  (CMD): Primary | ICD-10-CM

## 2025-08-13 DIAGNOSIS — E88.09 HYPOALBUMINEMIA: ICD-10-CM

## 2025-08-13 DIAGNOSIS — R78.89 SUBTHERAPEUTIC SERUM DILANTIN LEVEL: ICD-10-CM

## 2025-08-13 DIAGNOSIS — D50.0 IRON DEFICIENCY ANEMIA DUE TO CHRONIC BLOOD LOSS: ICD-10-CM

## 2025-08-13 DIAGNOSIS — D64.9 ACUTE ON CHRONIC ANEMIA: ICD-10-CM

## 2025-08-13 DIAGNOSIS — E87.20 ACIDOSIS: ICD-10-CM

## 2025-08-13 DIAGNOSIS — D50.0 IRON DEFICIENCY ANEMIA DUE TO CHRONIC BLOOD LOSS: Primary | ICD-10-CM

## 2025-08-13 LAB
ABO + RH BLD: NORMAL
ALBUMIN SERPL-MCNC: 2.6 G/DL (ref 3.4–5)
ALBUMIN/GLOB SERPL: 0.7 {RATIO} (ref 1–2.4)
ALP SERPL-CCNC: 117 UNITS/L (ref 45–117)
ALT SERPL-CCNC: 16 UNITS/L
ANION GAP SERPL CALC-SCNC: 17 MMOL/L (ref 7–19)
AST SERPL-CCNC: 12 UNITS/L
BASOPHILS # BLD: 0.1 K/MCL (ref 0–0.3)
BASOPHILS # BLD: 0.1 K/MCL (ref 0–0.3)
BASOPHILS NFR BLD: 1 %
BASOPHILS NFR BLD: 1 %
BILIRUB SERPL-MCNC: <0.2 MG/DL (ref 0.2–1)
BLD GP AB SCN SERPL QL GEL: NEGATIVE
BLOOD EXPIRATION DATE: NORMAL
BLOOD EXPIRATION DATE: NORMAL
BUN SERPL-MCNC: 31 MG/DL (ref 6–20)
BUN/CREAT SERPL: 28 (ref 7–25)
CALCIUM SERPL-MCNC: 7.9 MG/DL (ref 8.4–10.2)
CHLORIDE SERPL-SCNC: 112 MMOL/L (ref 97–110)
CO2 SERPL-SCNC: 17 MMOL/L (ref 21–32)
CREAT SERPL-MCNC: 1.09 MG/DL (ref 0.67–1.17)
CROSSMATCH RESULT: NORMAL
CROSSMATCH RESULT: NORMAL
DEPRECATED RDW RBC: 47.1 FL (ref 39–50)
DEPRECATED RDW RBC: 48.9 FL (ref 39–50)
DISPENSE STATUS: NORMAL
DISPENSE STATUS: NORMAL
EGFRCR SERPLBLD CKD-EPI 2021: 73 ML/MIN/{1.73_M2}
EOSINOPHIL # BLD: 0.2 K/MCL (ref 0–0.5)
EOSINOPHIL # BLD: 0.2 K/MCL (ref 0–0.5)
EOSINOPHIL NFR BLD: 3 %
EOSINOPHIL NFR BLD: 3 %
ERYTHROCYTE [DISTWIDTH] IN BLOOD: 19.2 % (ref 11–15)
ERYTHROCYTE [DISTWIDTH] IN BLOOD: 20 % (ref 11–15)
ETHANOL SERPL-MCNC: NORMAL MG/DL
FASTING DURATION TIME PATIENT: ABNORMAL H
GLOBULIN SER-MCNC: 3.5 G/DL (ref 2–4)
GLUCOSE SERPL-MCNC: 92 MG/DL (ref 70–99)
HCT VFR BLD CALC: 22.7 % (ref 39–51)
HCT VFR BLD CALC: 22.8 % (ref 39–51)
HCT VFR BLD CALC: 23.4 % (ref 39–51)
HGB BLD-MCNC: 6.3 G/DL (ref 13–17)
HGB BLD-MCNC: 6.6 G/DL (ref 13–17)
HGB BLD-MCNC: 6.8 G/DL (ref 13–17)
IMM GRANULOCYTES # BLD AUTO: 0 K/MCL (ref 0–0.2)
IMM GRANULOCYTES # BLD AUTO: 0.1 K/MCL (ref 0–0.2)
IMM GRANULOCYTES # BLD: 1 %
IMM GRANULOCYTES # BLD: 1 %
ISBT BLOOD TYPE: 9500
ISBT BLOOD TYPE: 9500
ISSUE DATE/TIME: NORMAL
ISSUE DATE/TIME: NORMAL
LYMPHOCYTES # BLD: 1 K/MCL (ref 1–4)
LYMPHOCYTES # BLD: 1.9 K/MCL (ref 1–4)
LYMPHOCYTES NFR BLD: 17 %
LYMPHOCYTES NFR BLD: 25 %
MCH RBC QN AUTO: 18.8 PG (ref 26–34)
MCH RBC QN AUTO: 19.7 PG (ref 26–34)
MCHC RBC AUTO-ENTMCNC: 27.6 G/DL (ref 32–36.5)
MCHC RBC AUTO-ENTMCNC: 29.1 G/DL (ref 32–36.5)
MCV RBC AUTO: 67.8 FL (ref 78–100)
MCV RBC AUTO: 68.1 FL (ref 78–100)
MONOCYTES # BLD: 0.7 K/MCL (ref 0.3–0.9)
MONOCYTES # BLD: 0.9 K/MCL (ref 0.3–0.9)
MONOCYTES NFR BLD: 11 %
MONOCYTES NFR BLD: 11 %
NEUTROPHILS # BLD: 4.1 K/MCL (ref 1.8–7.7)
NEUTROPHILS # BLD: 4.7 K/MCL (ref 1.8–7.7)
NEUTROPHILS NFR BLD: 59 %
NEUTROPHILS NFR BLD: 67 %
NRBC BLD MANUAL-RTO: 0 /100 WBC
NRBC BLD MANUAL-RTO: 0 /100 WBC
PHENYTOIN SERPL-MCNC: 5 MCG/ML (ref 10–20)
PHOSPHATE SERPL-MCNC: 3.6 MG/DL (ref 2.4–4.7)
PLATELET # BLD AUTO: 465 K/MCL (ref 140–450)
PLATELET # BLD AUTO: 528 K/MCL (ref 140–450)
POTASSIUM SERPL-SCNC: 3.9 MMOL/L (ref 3.4–5.1)
PRODUCT CODE: NORMAL
PRODUCT CODE: NORMAL
PRODUCT DESCRIPTION: NORMAL
PRODUCT DESCRIPTION: NORMAL
PRODUCT ID: NORMAL
PRODUCT ID: NORMAL
PROT SERPL-MCNC: 6.1 G/DL (ref 6.4–8.2)
RAINBOW EXTRA TUBES HOLD SPECIMEN: NORMAL
RBC # BLD: 3.35 MIL/MCL (ref 4.5–5.9)
RBC # BLD: 3.45 MIL/MCL (ref 4.5–5.9)
SODIUM SERPL-SCNC: 142 MMOL/L (ref 135–145)
TYPE AND SCREEN EXPIRATION DATE: NORMAL
UNIT BLOOD TYPE: NORMAL
UNIT BLOOD TYPE: NORMAL
UNIT NUMBER: NORMAL
UNIT NUMBER: NORMAL
WBC # BLD: 6.1 K/MCL (ref 4.2–11)
WBC # BLD: 7.8 K/MCL (ref 4.2–11)

## 2025-08-13 PROCEDURE — 82077 ASSAY SPEC XCP UR&BREATH IA: CPT | Performed by: STUDENT IN AN ORGANIZED HEALTH CARE EDUCATION/TRAINING PROGRAM

## 2025-08-13 PROCEDURE — 86923 COMPATIBILITY TEST ELECTRIC: CPT

## 2025-08-13 PROCEDURE — 10002807 HB RX 258: Performed by: STUDENT IN AN ORGANIZED HEALTH CARE EDUCATION/TRAINING PROGRAM

## 2025-08-13 PROCEDURE — P9016 RBC LEUKOCYTES REDUCED: HCPCS

## 2025-08-13 PROCEDURE — 10002800 HB RX 250 W HCPCS: Performed by: STUDENT IN AN ORGANIZED HEALTH CARE EDUCATION/TRAINING PROGRAM

## 2025-08-13 PROCEDURE — G0378 HOSPITAL OBSERVATION PER HR: HCPCS

## 2025-08-13 PROCEDURE — 96375 TX/PRO/DX INJ NEW DRUG ADDON: CPT

## 2025-08-13 PROCEDURE — 96374 THER/PROPH/DIAG INJ IV PUSH: CPT

## 2025-08-13 PROCEDURE — 85014 HEMATOCRIT: CPT | Performed by: INTERNAL MEDICINE

## 2025-08-13 PROCEDURE — 10002800 HB RX 250 W HCPCS: Performed by: PSYCHIATRY & NEUROLOGY

## 2025-08-13 PROCEDURE — 99291 CRITICAL CARE FIRST HOUR: CPT

## 2025-08-13 PROCEDURE — 86850 RBC ANTIBODY SCREEN: CPT | Performed by: STUDENT IN AN ORGANIZED HEALTH CARE EDUCATION/TRAINING PROGRAM

## 2025-08-13 PROCEDURE — 84100 ASSAY OF PHOSPHORUS: CPT | Performed by: STUDENT IN AN ORGANIZED HEALTH CARE EDUCATION/TRAINING PROGRAM

## 2025-08-13 PROCEDURE — 85025 COMPLETE CBC W/AUTO DIFF WBC: CPT | Performed by: INTERNAL MEDICINE

## 2025-08-13 PROCEDURE — 36415 COLL VENOUS BLD VENIPUNCTURE: CPT

## 2025-08-13 PROCEDURE — 99285 EMERGENCY DEPT VISIT HI MDM: CPT | Performed by: STUDENT IN AN ORGANIZED HEALTH CARE EDUCATION/TRAINING PROGRAM

## 2025-08-13 PROCEDURE — 99292 CRITICAL CARE ADDL 30 MIN: CPT

## 2025-08-13 PROCEDURE — 85025 COMPLETE CBC W/AUTO DIFF WBC: CPT | Performed by: STUDENT IN AN ORGANIZED HEALTH CARE EDUCATION/TRAINING PROGRAM

## 2025-08-13 PROCEDURE — 36430 TRANSFUSION BLD/BLD COMPNT: CPT

## 2025-08-13 PROCEDURE — 80053 COMPREHEN METABOLIC PANEL: CPT | Performed by: STUDENT IN AN ORGANIZED HEALTH CARE EDUCATION/TRAINING PROGRAM

## 2025-08-13 PROCEDURE — 36415 COLL VENOUS BLD VENIPUNCTURE: CPT | Performed by: INTERNAL MEDICINE

## 2025-08-13 PROCEDURE — 80185 ASSAY OF PHENYTOIN TOTAL: CPT | Performed by: STUDENT IN AN ORGANIZED HEALTH CARE EDUCATION/TRAINING PROGRAM

## 2025-08-13 PROCEDURE — 10002803 HB RX 637: Performed by: PSYCHIATRY & NEUROLOGY

## 2025-08-13 PROCEDURE — 10002803 HB RX 637: Performed by: INTERNAL MEDICINE

## 2025-08-13 PROCEDURE — 70450 CT HEAD/BRAIN W/O DYE: CPT

## 2025-08-13 RX ORDER — FAMOTIDINE 10 MG/ML
20 INJECTION, SOLUTION INTRAVENOUS ONCE
Status: COMPLETED | OUTPATIENT
Start: 2025-08-13 | End: 2025-08-13

## 2025-08-13 RX ORDER — LEVETIRACETAM 500 MG/1
500 TABLET ORAL EVERY 12 HOURS SCHEDULED
Status: DISCONTINUED | OUTPATIENT
Start: 2025-08-13 | End: 2025-08-16 | Stop reason: HOSPADM

## 2025-08-13 RX ORDER — LORAZEPAM 2 MG/ML
INJECTION INTRAMUSCULAR
Status: DISCONTINUED
Start: 2025-08-13 | End: 2025-08-13 | Stop reason: WASHOUT

## 2025-08-13 RX ORDER — PANTOPRAZOLE SODIUM 40 MG/1
40 TABLET, DELAYED RELEASE ORAL
Status: DISCONTINUED | OUTPATIENT
Start: 2025-08-13 | End: 2025-08-16 | Stop reason: HOSPADM

## 2025-08-13 RX ORDER — SODIUM CHLORIDE 9 MG/ML
INJECTION, SOLUTION INTRAVENOUS CONTINUOUS PRN
Status: ACTIVE | OUTPATIENT
Start: 2025-08-13 | End: 2025-08-14

## 2025-08-13 RX ORDER — LEVETIRACETAM 500 MG/5ML
1000 INJECTION, SOLUTION, CONCENTRATE INTRAVENOUS ONCE
Status: COMPLETED | OUTPATIENT
Start: 2025-08-13 | End: 2025-08-13

## 2025-08-13 RX ADMIN — LEVETIRACETAM 1000 MG: 100 INJECTION, SOLUTION INTRAVENOUS at 16:06

## 2025-08-13 RX ADMIN — FERRIC CARBOXYMALTOSE INJECTION 750 MG: 50 INJECTION, SOLUTION INTRAVENOUS at 13:51

## 2025-08-13 RX ADMIN — LEVETIRACETAM 500 MG: 500 TABLET, FILM COATED ORAL at 21:16

## 2025-08-13 RX ADMIN — SODIUM CHLORIDE 100 ML: 9 INJECTION, SOLUTION INTRAVENOUS at 13:51

## 2025-08-13 RX ADMIN — FAMOTIDINE 20 MG: 10 INJECTION INTRAVENOUS at 15:45

## 2025-08-13 RX ADMIN — PANTOPRAZOLE SODIUM 40 MG: 40 TABLET, DELAYED RELEASE ORAL at 21:16

## 2025-08-13 ASSESSMENT — PAIN SCALES - GENERAL
PAINLEVEL_OUTOF10: 0

## 2025-08-14 ENCOUNTER — APPOINTMENT (OUTPATIENT)
Dept: ULTRASOUND IMAGING | Age: 70
DRG: 101 | End: 2025-08-14
Attending: INTERNAL MEDICINE

## 2025-08-14 LAB
ANION GAP SERPL CALC-SCNC: 7 MMOL/L (ref 7–19)
APTT PPP: 27 SEC (ref 22–32)
BASOPHILS # BLD: 0.1 K/MCL (ref 0–0.3)
BASOPHILS NFR BLD: 1 %
BUN SERPL-MCNC: 20 MG/DL (ref 6–20)
BUN/CREAT SERPL: 21 (ref 7–25)
CALCIUM SERPL-MCNC: 8 MG/DL (ref 8.4–10.2)
CHLORIDE SERPL-SCNC: 114 MMOL/L (ref 97–110)
CO2 SERPL-SCNC: 24 MMOL/L (ref 21–32)
CREAT SERPL-MCNC: 0.96 MG/DL (ref 0.67–1.17)
DEPRECATED RDW RBC: 47.5 FL (ref 39–50)
DEPRECATED RDW RBC: 47.6 FL (ref 39–50)
EGFRCR SERPLBLD CKD-EPI 2021: 85 ML/MIN/{1.73_M2}
EOSINOPHIL # BLD: 0.3 K/MCL (ref 0–0.5)
EOSINOPHIL NFR BLD: 4 %
ERYTHROCYTE [DISTWIDTH] IN BLOOD: 19.6 % (ref 11–15)
ERYTHROCYTE [DISTWIDTH] IN BLOOD: 19.6 % (ref 11–15)
FASTING DURATION TIME PATIENT: ABNORMAL H
GLUCOSE SERPL-MCNC: 91 MG/DL (ref 70–99)
HCT VFR BLD CALC: 26.1 % (ref 39–51)
HCT VFR BLD CALC: 29.3 % (ref 39–51)
HGB BLD-MCNC: 7.5 G/DL (ref 13–17)
HGB BLD-MCNC: 8.4 G/DL (ref 13–17)
IMM GRANULOCYTES # BLD AUTO: 0 K/MCL (ref 0–0.2)
IMM GRANULOCYTES # BLD: 1 %
INR PPP: 1
LYMPHOCYTES # BLD: 1.2 K/MCL (ref 1–4)
LYMPHOCYTES NFR BLD: 17 %
MAGNESIUM SERPL-MCNC: 2.6 MG/DL (ref 1.7–2.4)
MCH RBC QN AUTO: 19.4 PG (ref 26–34)
MCH RBC QN AUTO: 19.5 PG (ref 26–34)
MCHC RBC AUTO-ENTMCNC: 28.7 G/DL (ref 32–36.5)
MCHC RBC AUTO-ENTMCNC: 28.7 G/DL (ref 32–36.5)
MCV RBC AUTO: 67.8 FL (ref 78–100)
MCV RBC AUTO: 67.8 FL (ref 78–100)
MONOCYTES # BLD: 0.7 K/MCL (ref 0.3–0.9)
MONOCYTES NFR BLD: 11 %
NEUTROPHILS # BLD: 4.4 K/MCL (ref 1.8–7.7)
NEUTROPHILS NFR BLD: 66 %
NRBC BLD MANUAL-RTO: 0 /100 WBC
NRBC BLD MANUAL-RTO: 0 /100 WBC
PLATELET # BLD AUTO: 427 K/MCL (ref 140–450)
PLATELET # BLD AUTO: 444 K/MCL (ref 140–450)
POTASSIUM SERPL-SCNC: 4 MMOL/L (ref 3.4–5.1)
PROTHROMBIN TIME: 10.8 SEC (ref 9.7–11.8)
RBC # BLD: 3.85 MIL/MCL (ref 4.5–5.9)
RBC # BLD: 4.32 MIL/MCL (ref 4.5–5.9)
SODIUM SERPL-SCNC: 141 MMOL/L (ref 135–145)
WBC # BLD: 6.7 K/MCL (ref 4.2–11)
WBC # BLD: 7.6 K/MCL (ref 4.2–11)

## 2025-08-14 PROCEDURE — 85025 COMPLETE CBC W/AUTO DIFF WBC: CPT | Performed by: INTERNAL MEDICINE

## 2025-08-14 PROCEDURE — 85027 COMPLETE CBC AUTOMATED: CPT | Performed by: STUDENT IN AN ORGANIZED HEALTH CARE EDUCATION/TRAINING PROGRAM

## 2025-08-14 PROCEDURE — 99233 SBSQ HOSP IP/OBS HIGH 50: CPT | Performed by: INTERNAL MEDICINE

## 2025-08-14 PROCEDURE — A4216 STERILE WATER/SALINE, 10 ML: HCPCS | Performed by: INTERNAL MEDICINE

## 2025-08-14 PROCEDURE — 36415 COLL VENOUS BLD VENIPUNCTURE: CPT | Performed by: INTERNAL MEDICINE

## 2025-08-14 PROCEDURE — 10006031 HB ROOM CHARGE TELEMETRY

## 2025-08-14 PROCEDURE — 10002800 HB RX 250 W HCPCS: Performed by: STUDENT IN AN ORGANIZED HEALTH CARE EDUCATION/TRAINING PROGRAM

## 2025-08-14 PROCEDURE — 10002803 HB RX 637: Performed by: INTERNAL MEDICINE

## 2025-08-14 PROCEDURE — G0378 HOSPITAL OBSERVATION PER HR: HCPCS

## 2025-08-14 PROCEDURE — 80048 BASIC METABOLIC PNL TOTAL CA: CPT | Performed by: INTERNAL MEDICINE

## 2025-08-14 PROCEDURE — 10002803 HB RX 637: Performed by: PSYCHIATRY & NEUROLOGY

## 2025-08-14 PROCEDURE — 93970 EXTREMITY STUDY: CPT

## 2025-08-14 PROCEDURE — 85730 THROMBOPLASTIN TIME PARTIAL: CPT | Performed by: STUDENT IN AN ORGANIZED HEALTH CARE EDUCATION/TRAINING PROGRAM

## 2025-08-14 PROCEDURE — 10004651 HB RX, NO CHARGE ITEM: Performed by: INTERNAL MEDICINE

## 2025-08-14 PROCEDURE — 85610 PROTHROMBIN TIME: CPT | Performed by: STUDENT IN AN ORGANIZED HEALTH CARE EDUCATION/TRAINING PROGRAM

## 2025-08-14 PROCEDURE — 83735 ASSAY OF MAGNESIUM: CPT | Performed by: INTERNAL MEDICINE

## 2025-08-14 RX ORDER — ACETAMINOPHEN 650 MG/1
650 SUPPOSITORY RECTAL EVERY 4 HOURS PRN
Status: DISCONTINUED | OUTPATIENT
Start: 2025-08-14 | End: 2025-08-16 | Stop reason: HOSPADM

## 2025-08-14 RX ORDER — 0.9 % SODIUM CHLORIDE 0.9 %
2 VIAL (ML) INJECTION EVERY 12 HOURS SCHEDULED
Status: DISCONTINUED | OUTPATIENT
Start: 2025-08-14 | End: 2025-08-16 | Stop reason: HOSPADM

## 2025-08-14 RX ORDER — 0.9 % SODIUM CHLORIDE 0.9 %
10 VIAL (ML) INJECTION PRN
Status: DISCONTINUED | OUTPATIENT
Start: 2025-08-14 | End: 2025-08-16 | Stop reason: HOSPADM

## 2025-08-14 RX ORDER — ACETAMINOPHEN 325 MG/1
650 TABLET ORAL EVERY 4 HOURS PRN
Status: DISCONTINUED | OUTPATIENT
Start: 2025-08-14 | End: 2025-08-16 | Stop reason: HOSPADM

## 2025-08-14 RX ORDER — LEVETIRACETAM 500 MG/1
500 TABLET ORAL 2 TIMES DAILY
Qty: 180 TABLET | Refills: 3 | Status: SHIPPED | OUTPATIENT
Start: 2025-08-14 | End: 2025-08-16

## 2025-08-14 RX ORDER — HEPARIN SODIUM 10000 [USP'U]/100ML
1-40 INJECTION, SOLUTION INTRAVENOUS CONTINUOUS
Status: DISCONTINUED | OUTPATIENT
Start: 2025-08-14 | End: 2025-08-16

## 2025-08-14 RX ADMIN — PANTOPRAZOLE SODIUM 40 MG: 40 TABLET, DELAYED RELEASE ORAL at 05:10

## 2025-08-14 RX ADMIN — SODIUM CHLORIDE, PRESERVATIVE FREE 2 ML: 5 INJECTION INTRAVENOUS at 10:43

## 2025-08-14 RX ADMIN — HEPARIN SODIUM 18 UNITS/KG/HR: 10000 INJECTION, SOLUTION INTRAVENOUS at 18:11

## 2025-08-14 RX ADMIN — SODIUM CHLORIDE, PRESERVATIVE FREE 2 ML: 5 INJECTION INTRAVENOUS at 20:43

## 2025-08-14 RX ADMIN — LEVETIRACETAM 500 MG: 500 TABLET, FILM COATED ORAL at 10:43

## 2025-08-14 RX ADMIN — LEVETIRACETAM 500 MG: 500 TABLET, FILM COATED ORAL at 20:43

## 2025-08-14 RX ADMIN — HEPARIN SODIUM 3000 UNITS: 1000 INJECTION, SOLUTION INTRAVENOUS; SUBCUTANEOUS at 18:09

## 2025-08-14 RX ADMIN — IRON SUCROSE 200 MG: 20 INJECTION, SOLUTION INTRAVENOUS at 13:47

## 2025-08-14 RX ADMIN — SODIUM CHLORIDE, PRESERVATIVE FREE 2 ML: 5 INJECTION INTRAVENOUS at 00:24

## 2025-08-14 SDOH — ECONOMIC STABILITY: HOUSING INSECURITY: WHAT IS YOUR LIVING SITUATION TODAY?: I HAVE A STEADY PLACE TO LIVE

## 2025-08-14 ASSESSMENT — PAIN SCALES - GENERAL
PAINLEVEL_OUTOF10: 0
PAINLEVEL_OUTOF10: 0

## 2025-08-14 ASSESSMENT — COGNITIVE AND FUNCTIONAL STATUS - GENERAL
BECAUSE OF A PHYSICAL, MENTAL, OR EMOTIONAL CONDITION, DO YOU HAVE SERIOUS DIFFICULTY CONCENTRATING, REMEMBERING OR MAKING DECISIONS: YES
DO YOU HAVE SERIOUS DIFFICULTY WALKING OR CLIMBING STAIRS: YES
BECAUSE OF A PHYSICAL, MENTAL, OR EMOTIONAL CONDITION, DO YOU HAVE DIFFICULTY DOING ERRANDS ALONE: YES

## 2025-08-15 LAB
ANION GAP SERPL CALC-SCNC: 8 MMOL/L (ref 7–19)
APTT PPP: 46 SEC (ref 22–32)
APTT PPP: 49 SEC (ref 22–32)
BASOPHILS # BLD: 0.1 K/MCL (ref 0–0.3)
BASOPHILS NFR BLD: 1 %
BUN SERPL-MCNC: 14 MG/DL (ref 6–20)
BUN/CREAT SERPL: 17 (ref 7–25)
CALCIUM SERPL-MCNC: 8.1 MG/DL (ref 8.4–10.2)
CHLORIDE SERPL-SCNC: 112 MMOL/L (ref 97–110)
CO2 SERPL-SCNC: 24 MMOL/L (ref 21–32)
CREAT SERPL-MCNC: 0.81 MG/DL (ref 0.67–1.17)
DEPRECATED RDW RBC: 47.8 FL (ref 39–50)
EGFRCR SERPLBLD CKD-EPI 2021: >90 ML/MIN/{1.73_M2}
EOSINOPHIL # BLD: 0.3 K/MCL (ref 0–0.5)
EOSINOPHIL NFR BLD: 4 %
ERYTHROCYTE [DISTWIDTH] IN BLOOD: 20 % (ref 11–15)
FASTING DURATION TIME PATIENT: ABNORMAL H
GLUCOSE SERPL-MCNC: 101 MG/DL (ref 70–99)
HCT VFR BLD CALC: 27.6 % (ref 39–51)
HGB BLD-MCNC: 8.1 G/DL (ref 13–17)
IMM GRANULOCYTES # BLD AUTO: 0.1 K/MCL (ref 0–0.2)
IMM GRANULOCYTES # BLD: 1 %
LYMPHOCYTES # BLD: 1.4 K/MCL (ref 1–4)
LYMPHOCYTES NFR BLD: 19 %
MCH RBC QN AUTO: 19.8 PG (ref 26–34)
MCHC RBC AUTO-ENTMCNC: 29.3 G/DL (ref 32–36.5)
MCV RBC AUTO: 67.5 FL (ref 78–100)
MONOCYTES # BLD: 0.7 K/MCL (ref 0.3–0.9)
MONOCYTES NFR BLD: 9 %
NEUTROPHILS # BLD: 4.9 K/MCL (ref 1.8–7.7)
NEUTROPHILS NFR BLD: 66 %
NRBC BLD MANUAL-RTO: 0 /100 WBC
PLATELET # BLD AUTO: 443 K/MCL (ref 140–450)
POTASSIUM SERPL-SCNC: 3.8 MMOL/L (ref 3.4–5.1)
RAINBOW EXTRA TUBES HOLD SPECIMEN: NORMAL
RAINBOW EXTRA TUBES HOLD SPECIMEN: NORMAL
RBC # BLD: 4.09 MIL/MCL (ref 4.5–5.9)
SODIUM SERPL-SCNC: 140 MMOL/L (ref 135–145)
WBC # BLD: 7.4 K/MCL (ref 4.2–11)

## 2025-08-15 PROCEDURE — 36415 COLL VENOUS BLD VENIPUNCTURE: CPT | Performed by: INTERNAL MEDICINE

## 2025-08-15 PROCEDURE — 85730 THROMBOPLASTIN TIME PARTIAL: CPT | Performed by: STUDENT IN AN ORGANIZED HEALTH CARE EDUCATION/TRAINING PROGRAM

## 2025-08-15 PROCEDURE — 85730 THROMBOPLASTIN TIME PARTIAL: CPT | Performed by: INTERNAL MEDICINE

## 2025-08-15 PROCEDURE — 10002803 HB RX 637: Performed by: INTERNAL MEDICINE

## 2025-08-15 PROCEDURE — A4216 STERILE WATER/SALINE, 10 ML: HCPCS | Performed by: INTERNAL MEDICINE

## 2025-08-15 PROCEDURE — 10004651 HB RX, NO CHARGE ITEM: Performed by: INTERNAL MEDICINE

## 2025-08-15 PROCEDURE — 80048 BASIC METABOLIC PNL TOTAL CA: CPT | Performed by: INTERNAL MEDICINE

## 2025-08-15 PROCEDURE — 85025 COMPLETE CBC W/AUTO DIFF WBC: CPT | Performed by: INTERNAL MEDICINE

## 2025-08-15 PROCEDURE — 99233 SBSQ HOSP IP/OBS HIGH 50: CPT | Performed by: INTERNAL MEDICINE

## 2025-08-15 PROCEDURE — 10006031 HB ROOM CHARGE TELEMETRY

## 2025-08-15 PROCEDURE — 10002800 HB RX 250 W HCPCS: Performed by: STUDENT IN AN ORGANIZED HEALTH CARE EDUCATION/TRAINING PROGRAM

## 2025-08-15 PROCEDURE — 10002803 HB RX 637: Performed by: PSYCHIATRY & NEUROLOGY

## 2025-08-15 RX ADMIN — PANTOPRAZOLE SODIUM 40 MG: 40 TABLET, DELAYED RELEASE ORAL at 18:02

## 2025-08-15 RX ADMIN — PANTOPRAZOLE SODIUM 40 MG: 40 TABLET, DELAYED RELEASE ORAL at 06:52

## 2025-08-15 RX ADMIN — IRON SUCROSE 200 MG: 20 INJECTION, SOLUTION INTRAVENOUS at 10:44

## 2025-08-15 RX ADMIN — LEVETIRACETAM 500 MG: 500 TABLET, FILM COATED ORAL at 20:21

## 2025-08-15 RX ADMIN — HEPARIN SODIUM 18 UNITS/KG/HR: 10000 INJECTION, SOLUTION INTRAVENOUS at 13:58

## 2025-08-15 RX ADMIN — LEVETIRACETAM 500 MG: 500 TABLET, FILM COATED ORAL at 10:44

## 2025-08-15 RX ADMIN — SODIUM CHLORIDE, PRESERVATIVE FREE 2 ML: 5 INJECTION INTRAVENOUS at 10:50

## 2025-08-15 RX ADMIN — SODIUM CHLORIDE, PRESERVATIVE FREE 2 ML: 5 INJECTION INTRAVENOUS at 20:20

## 2025-08-15 ASSESSMENT — PAIN SCALES - GENERAL
PAINLEVEL_OUTOF10: 0
PAINLEVEL_OUTOF10: 0

## 2025-08-16 VITALS
BODY MASS INDEX: 24.53 KG/M2 | OXYGEN SATURATION: 96 % | WEIGHT: 156.31 LBS | HEIGHT: 67 IN | HEART RATE: 76 BPM | SYSTOLIC BLOOD PRESSURE: 126 MMHG | RESPIRATION RATE: 16 BRPM | DIASTOLIC BLOOD PRESSURE: 70 MMHG | TEMPERATURE: 97.5 F

## 2025-08-16 LAB
ANION GAP SERPL CALC-SCNC: 9 MMOL/L (ref 7–19)
APTT PPP: 50 SEC (ref 22–32)
BUN SERPL-MCNC: 19 MG/DL (ref 6–20)
BUN/CREAT SERPL: 22 (ref 7–25)
CALCIUM SERPL-MCNC: 7.9 MG/DL (ref 8.4–10.2)
CHLORIDE SERPL-SCNC: 112 MMOL/L (ref 97–110)
CO2 SERPL-SCNC: 25 MMOL/L (ref 21–32)
CREAT SERPL-MCNC: 0.86 MG/DL (ref 0.67–1.17)
DEPRECATED RDW RBC: 48.3 FL (ref 39–50)
EGFRCR SERPLBLD CKD-EPI 2021: >90 ML/MIN/{1.73_M2}
ERYTHROCYTE [DISTWIDTH] IN BLOOD: 20.7 % (ref 11–15)
FASTING DURATION TIME PATIENT: ABNORMAL H
GLUCOSE SERPL-MCNC: 104 MG/DL (ref 70–99)
HCT VFR BLD CALC: 27.9 % (ref 39–51)
HGB BLD-MCNC: 8.2 G/DL (ref 13–17)
MCH RBC QN AUTO: 20.1 PG (ref 26–34)
MCHC RBC AUTO-ENTMCNC: 29.4 G/DL (ref 32–36.5)
MCV RBC AUTO: 68.4 FL (ref 78–100)
NRBC BLD MANUAL-RTO: 0 /100 WBC
PLATELET # BLD AUTO: 460 K/MCL (ref 140–450)
POTASSIUM SERPL-SCNC: 3.8 MMOL/L (ref 3.4–5.1)
RBC # BLD: 4.08 MIL/MCL (ref 4.5–5.9)
SODIUM SERPL-SCNC: 142 MMOL/L (ref 135–145)
WBC # BLD: 8.5 K/MCL (ref 4.2–11)

## 2025-08-16 PROCEDURE — 85730 THROMBOPLASTIN TIME PARTIAL: CPT | Performed by: STUDENT IN AN ORGANIZED HEALTH CARE EDUCATION/TRAINING PROGRAM

## 2025-08-16 PROCEDURE — 10002800 HB RX 250 W HCPCS: Performed by: STUDENT IN AN ORGANIZED HEALTH CARE EDUCATION/TRAINING PROGRAM

## 2025-08-16 PROCEDURE — 10004651 HB RX, NO CHARGE ITEM: Performed by: INTERNAL MEDICINE

## 2025-08-16 PROCEDURE — 36415 COLL VENOUS BLD VENIPUNCTURE: CPT | Performed by: STUDENT IN AN ORGANIZED HEALTH CARE EDUCATION/TRAINING PROGRAM

## 2025-08-16 PROCEDURE — 10002803 HB RX 637: Performed by: STUDENT IN AN ORGANIZED HEALTH CARE EDUCATION/TRAINING PROGRAM

## 2025-08-16 PROCEDURE — 10002803 HB RX 637: Performed by: INTERNAL MEDICINE

## 2025-08-16 PROCEDURE — 10002803 HB RX 637: Performed by: PSYCHIATRY & NEUROLOGY

## 2025-08-16 PROCEDURE — 80048 BASIC METABOLIC PNL TOTAL CA: CPT | Performed by: INTERNAL MEDICINE

## 2025-08-16 PROCEDURE — A4216 STERILE WATER/SALINE, 10 ML: HCPCS | Performed by: INTERNAL MEDICINE

## 2025-08-16 PROCEDURE — 85027 COMPLETE CBC AUTOMATED: CPT | Performed by: STUDENT IN AN ORGANIZED HEALTH CARE EDUCATION/TRAINING PROGRAM

## 2025-08-16 RX ORDER — PANTOPRAZOLE SODIUM 40 MG/1
40 TABLET, DELAYED RELEASE ORAL
Qty: 60 TABLET | Refills: 1 | Status: SHIPPED | OUTPATIENT
Start: 2025-08-16

## 2025-08-16 RX ORDER — LEVETIRACETAM 500 MG/1
500 TABLET ORAL 2 TIMES DAILY
Qty: 180 TABLET | Refills: 3 | Status: SHIPPED | OUTPATIENT
Start: 2025-08-16

## 2025-08-16 RX ORDER — PANTOPRAZOLE SODIUM 40 MG/1
40 TABLET, DELAYED RELEASE ORAL
Qty: 60 TABLET | Refills: 0 | Status: CANCELLED | OUTPATIENT
Start: 2025-08-16

## 2025-08-16 RX ADMIN — APIXABAN 5 MG: 5 TABLET, FILM COATED ORAL at 11:32

## 2025-08-16 RX ADMIN — PANTOPRAZOLE SODIUM 40 MG: 40 TABLET, DELAYED RELEASE ORAL at 16:05

## 2025-08-16 RX ADMIN — SODIUM CHLORIDE, PRESERVATIVE FREE 2 ML: 5 INJECTION INTRAVENOUS at 08:54

## 2025-08-16 RX ADMIN — LEVETIRACETAM 500 MG: 500 TABLET, FILM COATED ORAL at 08:54

## 2025-08-16 RX ADMIN — PANTOPRAZOLE SODIUM 40 MG: 40 TABLET, DELAYED RELEASE ORAL at 08:54

## 2025-08-16 RX ADMIN — IRON SUCROSE 200 MG: 20 INJECTION, SOLUTION INTRAVENOUS at 08:54

## 2025-08-16 ASSESSMENT — ORIENTATION MEMORY CONCENTRATION TEST (OMCT)
SAY THE MONTHS IN REVERSE ORDER STARTING WITH LAST MONTH: 1 ERROR
OMCT INTERPRETATION: 7-10: MILD COGNITIVE IMPAIRMENT
REPEAT THE NAME AND ADDRESS I ASKED YOU TO REMEMBER: 1 ERROR
OMCT SCORE: 9
COUNT BACKWARDS FROM 20 TO 1: 1 ERROR
WHAT MONTH IS IT NOW: CORRECT
WHAT TIME IS IT (NO WATCH OR CLOCK): INCORRECT
WHAT YEAR IS IT NOW (MUST BE EXACT): CORRECT

## 2025-08-16 ASSESSMENT — PAIN SCALES - GENERAL: PAINLEVEL_OUTOF10: 0

## 2025-08-20 ENCOUNTER — OFFICE VISIT (OUTPATIENT)
Dept: HEMATOLOGY/ONCOLOGY | Age: 70
End: 2025-08-20
Attending: STUDENT IN AN ORGANIZED HEALTH CARE EDUCATION/TRAINING PROGRAM

## 2025-08-20 ENCOUNTER — HOSPITAL ENCOUNTER (OUTPATIENT)
Dept: INFUSION THERAPY | Age: 70
Discharge: STILL ADMITTED OR ADMINISTRATIVE | End: 2025-08-20
Attending: STUDENT IN AN ORGANIZED HEALTH CARE EDUCATION/TRAINING PROGRAM

## 2025-08-20 VITALS
HEART RATE: 63 BPM | RESPIRATION RATE: 16 BRPM | BODY MASS INDEX: 22.63 KG/M2 | OXYGEN SATURATION: 98 % | SYSTOLIC BLOOD PRESSURE: 108 MMHG | WEIGHT: 144.51 LBS | DIASTOLIC BLOOD PRESSURE: 67 MMHG | TEMPERATURE: 97.9 F

## 2025-08-20 VITALS
HEIGHT: 67 IN | OXYGEN SATURATION: 97 % | TEMPERATURE: 98.1 F | WEIGHT: 143 LBS | HEART RATE: 62 BPM | RESPIRATION RATE: 16 BRPM | BODY MASS INDEX: 22.44 KG/M2 | DIASTOLIC BLOOD PRESSURE: 71 MMHG | SYSTOLIC BLOOD PRESSURE: 109 MMHG

## 2025-08-20 DIAGNOSIS — D50.0 IRON DEFICIENCY ANEMIA DUE TO CHRONIC BLOOD LOSS: Primary | ICD-10-CM

## 2025-08-20 PROBLEM — I82.402 ACUTE DEEP VEIN THROMBOSIS (DVT) OF LEFT LOWER EXTREMITY  (CMD): Status: ACTIVE | Noted: 2025-08-20

## 2025-08-20 PROBLEM — G40.919 BREAKTHROUGH SEIZURE  (CMD): Status: RESOLVED | Noted: 2025-08-13 | Resolved: 2025-08-20

## 2025-08-20 PROBLEM — F03.90 DEMENTIA (CMD): Status: ACTIVE | Noted: 2025-07-18

## 2025-08-20 PROBLEM — G31.84 MILD COGNITIVE IMPAIRMENT: Status: RESOLVED | Noted: 2025-07-18 | Resolved: 2025-08-20

## 2025-08-20 PROCEDURE — 10002807 HB RX 258: Performed by: STUDENT IN AN ORGANIZED HEALTH CARE EDUCATION/TRAINING PROGRAM

## 2025-08-20 PROCEDURE — 96365 THER/PROPH/DIAG IV INF INIT: CPT

## 2025-08-20 PROCEDURE — 99214 OFFICE O/P EST MOD 30 MIN: CPT | Performed by: STUDENT IN AN ORGANIZED HEALTH CARE EDUCATION/TRAINING PROGRAM

## 2025-08-20 PROCEDURE — 10002800 HB RX 250 W HCPCS: Performed by: STUDENT IN AN ORGANIZED HEALTH CARE EDUCATION/TRAINING PROGRAM

## 2025-08-20 RX ADMIN — SODIUM CHLORIDE 250 ML: 9 INJECTION, SOLUTION INTRAVENOUS at 11:18

## 2025-08-20 RX ADMIN — FERRIC CARBOXYMALTOSE INJECTION 750 MG: 50 INJECTION, SOLUTION INTRAVENOUS at 11:19

## 2025-08-20 ASSESSMENT — PAIN SCALES - GENERAL
PAINLEVEL_OUTOF10: 0

## 2025-08-20 ASSESSMENT — PATIENT HEALTH QUESTIONNAIRE - PHQ9: SUM OF ALL RESPONSES TO PHQ9 QUESTIONS 1 AND 2: 0

## (undated) DEVICE — BITEBLOCK ENDOSCP 60FR MAXI STRP

## (undated) DEVICE — KIT VLV 5 PC AIR H2O SUCT BX ENDOGATOR CONN

## (undated) DEVICE — V2 SPECIMEN COLLECTION MANIFOLD KIT: Brand: NEPTUNE

## (undated) DEVICE — KIT CUSTOM ENDOPROCEDURE STERIS

## (undated) DEVICE — GIJAW SINGLE-USE BIOPSY FORCEPS WITH NEEDLE: Brand: GIJAW

## (undated) DEVICE — 10FT COMBINED O2 DELIVERY/CO2 MONITORING. FILTER WITH MICROSTREAM TYPE LUER: Brand: DUAL ADULT NASAL CANNULA

## (undated) DEVICE — 1200CC GUARDIAN II: Brand: GUARDIAN

## (undated) DEVICE — 3M™ RED DOT™ MONITORING ELECTRODE WITH FOAM TAPE AND STICKY GEL, 50/BAG, 20/CASE, 72/PLT 2570: Brand: RED DOT™

## (undated) NOTE — LETTER
[address]  [address]  Authorization for Surgical Operation and Procedure     Date:___________                                                                                                         Time:__________  I hereby authorize Surgeon(s):  Alex Dupree MD, my physician and his/her assistants (if applicable), which may include medical students, residents, and/or fellows, to perform the following surgical operation/ procedure and administer such anesthesia as may be determined necessary by my physician:  Operation/Procedure name (s) Procedure(s):  ESOPHAGOGASTRODUODENOSCOPY on Yusuf Wetzel   2.   I recognize that during the surgical operation/procedure, unforeseen conditions may necessitate additional or different procedures than those listed above.  I, therefore, further authorize and request that the above-named surgeon, assistants, or designees perform such procedures as are, in their judgment, necessary and desirable.    3.   My surgeon/physician has discussed prior to my surgery the potential benefits, risks and side effects of this procedure; the likelihood of achieving goals; and potential problems that might occur during recuperation.  They also discussed reasonable alternatives to the procedure, including risks, benefits, and side effects related to the alternatives and risks related to not receiving this procedure.  I have had all my questions answered and I acknowledge that no guarantee has been made as to the result that may be obtained.    4.   Should the need arise during my operation/procedure, which includes change of level of care prior to discharge, I also consent to the administration of blood and/or blood products.  Further, I understand that despite careful testing and screening of blood or blood products by collecting agencies, I may still be subject to ill effects as a result of receiving a blood transfusion and/or blood products.  The  following are some, but not all, of the potential risks that can occur: fever and allergic reactions, hemolytic reactions, transmission of diseases such as Hepatitis, AIDS and Cytomegalovirus (CMV) and fluid overload.  In the event that I wish to have an autologous transfusion of my own blood, or a directed donor transfusion, I will discuss this with my physician.  Check only if Refusing Blood or Blood Products  I understand refusal of blood or blood products as deemed necessary by my physician may have serious consequences to my condition to include possible death. I hereby assume responsibility for my refusal and release the hospital, its personnel, and my physicians from any responsibility for the consequences of my refusal.          o  Refuse      5.   I authorize the use of any specimen, organs, tissues, body parts or foreign objects that may be removed from my body during the operation/procedure for diagnosis, research or teaching purposes and their subsequent disposal by hospital authorities.  I also authorize the release of specimen test results and/or written reports to my treating physician on the hospital medical staff or other referring or consulting physicians involved in my care, at the discretion of the Pathologist or my treating physician.    6.   I consent to the photographing or videotaping of the operations or procedures to be performed, including appropriate portions of my body for medical, scientific, or educational purposes, provided my identity is not revealed by the pictures or by descriptive texts accompanying them.  If the procedure has been photographed/videotaped, the surgeon will obtain the original picture, image, videotape or CD.  The hospital will not be responsible for storage, release or maintenance of the picture, image, tape or CD.    7.   I consent to the presence of a  or observers in the operating room as deemed necessary by my physician or their designees.     8.   I recognize that in the event my procedure results in extended X-Ray/fluoroscopy time, I may develop a skin reaction.    9. If I have a Do Not Attempt Resuscitation (DNAR) order in place, that status will be suspended while in the operating room, procedural suite, and during the recovery period unless otherwise explicitly stated by me (or a person authorized to consent on my behalf). The surgeon or my attending physician will determine when the applicable recovery period ends for purposes of reinstating the DNAR order.  10. Patients having a sterilization procedure: I understand that if the procedure is successful the results will be permanent and it will therefore be impossible for me to inseminate, conceive, or bear children.  I also understand that the procedure is intended to result in sterility, although the result has not been guaranteed.   11. I acknowledge that my physician has explained sedation/analgesia administration to me including the risk and benefits I consent to the administration of sedation/analgesia as may be necessary or desirable in the judgment of my physician.    I CERTIFY THAT I HAVE READ AND FULLY UNDERSTAND THE ABOVE CONSENT TO OPERATION and/or OTHER PROCEDURE.    _________________________________________  __________________________________  Signature of Patient     Signature of Responsible Person         ___________________________________         Printed Name of Responsible Person           _________________________________                 Relationship to Patient  _________________________________________  ______________________________  Signature of Witness          Date  Time      Patient Name: Yusuf AGUILAR Damari     : 10/1/1955                 Printed: 2024     Medical Record #: [id number]                     Page [address] of [address] [place] [address]  [place]  [address]    Consent for Anesthesia    I, Yusuf Wetzel agree to  be cared for by an anesthesiologist, who is specially trained to monitor me and give me medicine to put me to sleep or keep me comfortable during my procedure    I understand that my anesthesiologist is not an employee or agent of [facility] or [employer]. He or she works for [employer].    As the patient asking for anesthesia services, I agree to:  Allow the anesthesiologist (anesthesia doctor) to give me medicine and do additional procedures as necessary. Some examples are: Starting or using an “IV” to give me medicine, fluids or blood during my procedure, and having a breathing tube placed to help me breathe when I’m asleep (intubation). In the event that my heart stops working properly, I understand that my anesthesiologist will make every effort to sustain my life, unless otherwise directed by [facility] Do Not Resuscitate documents.  Tell my anesthesia doctor before my procedure:  If I am pregnant.  The last time that I ate or drank.  All of the medicines I take (including prescriptions, herbal supplements, and pills I can buy without a prescription (including street drugs/illegal medications). Failure to inform my anesthesiologist about these medicines may increase my risk of anesthetic complications.  If I am allergic to anything or have had a reaction to anesthesia before.  I understand how the anesthesia medicine will help me (benefits).  I understand that with any type of anesthesia medicine there are risks:  The most common risks are: nausea, vomiting, sore throat, muscle soreness, damage to my eyes, mouth, or teeth (from breathing tube placement).  Rare risks include: remembering what happened during my procedure, allergic reactions to medications, injury to my airway, heart, lungs, vision, nerves, or muscles and in extremely rare instances death.  My doctor has explained to me other choices available to me for my care (alternatives).  Pregnant Patients  (“epidural”):  I understand that the risks of having an epidural (medicine given into my back to help control pain during labor), include itching, low blood pressure, difficulty urinating, headache or slowing of the baby’s heart. Very rare risks include infection, bleeding, seizure, irregular heart rhythms and nerve injury.  Regional Anesthesia (“spinal”, “epidural”, & “nerve blocks”):  I understand that rare but potential complications include headache, bleeding, infection, seizure, irregular heart rhythms, and nerve injury.    I can change my mind about having anesthesia services at any time before I get the medicine.    _____________________________________________________________________________  Patient (or Representative) Signature/Relationship to Patient  Date   Time    _____________________________________________________________________________   Name (if used)    Language/Organization   Time    _____________________________________________________________________________  Anesthesiologist Signature     Date   Time  I have discussed the procedure and information above with the patient (or patient’s representative) and answered their questions. The patient or their representative has agreed to have anesthesia services.    _____________________________________________________________________________  Witness        Date   Time  I have verified that the signature is that of the patient or patient’s representative, and that it was signed before the procedure  Patient Name: Yusuf Wetzel     : 10/1/1955                 Printed: 2024     Medical Record #: [id number]                     Page 2 of 2